# Patient Record
Sex: FEMALE | Race: WHITE | Employment: OTHER | URBAN - NONMETROPOLITAN AREA
[De-identification: names, ages, dates, MRNs, and addresses within clinical notes are randomized per-mention and may not be internally consistent; named-entity substitution may affect disease eponyms.]

---

## 2017-01-04 ENCOUNTER — PATIENT MESSAGE (OUTPATIENT)
Dept: FAMILY MEDICINE CLINIC | Facility: CLINIC | Age: 62
End: 2017-01-04

## 2017-01-04 NOTE — TELEPHONE ENCOUNTER
From: Radha Bolaños  To: Jonny Ricardo., DO  Sent: 1/4/2017 4:10 PM CST  Subject: Other    I went to a Dr. Allison Zamorano after my hospital stay in May. He told me I would not need a colonoscopy until 2020 at least. Please take the reminder off my chart.

## 2017-01-05 ENCOUNTER — MED REC SCAN ONLY (OUTPATIENT)
Dept: FAMILY MEDICINE CLINIC | Facility: CLINIC | Age: 62
End: 2017-01-05

## 2017-01-05 NOTE — TELEPHONE ENCOUNTER
Regarding: Other  Contact: [phone]  ----- Message from Freddy Reyes RN sent at 1/4/2017  4:50 PM CST -----       ----- Message from Philomena Duron to James Cutler DO sent at 1/4/2017  4:10 PM -----   I went to a Dr. Erica Carver after my hospital

## 2017-01-11 ENCOUNTER — APPOINTMENT (OUTPATIENT)
Dept: LAB | Age: 62
End: 2017-01-11
Attending: FAMILY MEDICINE
Payer: COMMERCIAL

## 2017-01-11 DIAGNOSIS — R74.8 ELEVATED LIVER ENZYMES: ICD-10-CM

## 2017-01-11 PROCEDURE — 80053 COMPREHEN METABOLIC PANEL: CPT

## 2017-01-11 PROCEDURE — 36415 COLL VENOUS BLD VENIPUNCTURE: CPT

## 2017-02-07 ENCOUNTER — APPOINTMENT (OUTPATIENT)
Dept: LAB | Age: 62
End: 2017-02-07
Attending: FAMILY MEDICINE
Payer: COMMERCIAL

## 2017-02-07 ENCOUNTER — OFFICE VISIT (OUTPATIENT)
Dept: FAMILY MEDICINE CLINIC | Facility: CLINIC | Age: 62
End: 2017-02-07

## 2017-02-07 VITALS
WEIGHT: 128 LBS | OXYGEN SATURATION: 98 % | HEIGHT: 68.25 IN | TEMPERATURE: 98 F | HEART RATE: 88 BPM | BODY MASS INDEX: 19.4 KG/M2 | DIASTOLIC BLOOD PRESSURE: 88 MMHG | SYSTOLIC BLOOD PRESSURE: 152 MMHG

## 2017-02-07 DIAGNOSIS — E78.00 HYPERCHOLESTEREMIA: ICD-10-CM

## 2017-02-07 DIAGNOSIS — N39.43 POST-VOID DRIBBLING: ICD-10-CM

## 2017-02-07 DIAGNOSIS — E55.9 VITAMIN D DEFICIENCY: ICD-10-CM

## 2017-02-07 DIAGNOSIS — R03.0 ELEVATED BLOOD PRESSURE READING: ICD-10-CM

## 2017-02-07 DIAGNOSIS — R74.8 ELEVATED LIVER ENZYMES: ICD-10-CM

## 2017-02-07 DIAGNOSIS — Z00.00 PREVENTATIVE HEALTH CARE: Primary | ICD-10-CM

## 2017-02-07 DIAGNOSIS — K21.9 GASTROESOPHAGEAL REFLUX DISEASE WITHOUT ESOPHAGITIS: ICD-10-CM

## 2017-02-07 DIAGNOSIS — N81.11 MIDLINE CYSTOCELE: ICD-10-CM

## 2017-02-07 LAB
25-HYDROXYVITAMIN D (TOTAL): 55.3 NG/ML (ref 30–100)
ALT SERPL-CCNC: 117 U/L (ref 14–54)
APPEARANCE: CLEAR
AST SERPL-CCNC: 92 U/L (ref 15–41)
CHOLEST SMN-MCNC: 262 MG/DL (ref ?–200)
HDLC SERPL-MCNC: 87 MG/DL (ref 45–?)
HDLC SERPL: 3.01 {RATIO} (ref ?–4.44)
LDLC SERPL CALC-MCNC: 157 MG/DL (ref ?–130)
MULTISTIX LOT#: NORMAL NUMERIC
NONHDLC SERPL-MCNC: 175 MG/DL (ref ?–130)
PH, URINE: 6.5 (ref 4.5–8)
SPECIFIC GRAVITY: 1.02 (ref 1–1.03)
TRIGLYCERIDES: 89 MG/DL (ref ?–150)
URINE-COLOR: YELLOW
UROBILINOGEN,SEMI-QN: 0.2 MG/DL (ref 0–1.9)
VLDL: 18 MG/DL (ref 5–40)

## 2017-02-07 PROCEDURE — 36415 COLL VENOUS BLD VENIPUNCTURE: CPT | Performed by: FAMILY MEDICINE

## 2017-02-07 PROCEDURE — 80061 LIPID PANEL: CPT

## 2017-02-07 PROCEDURE — 82306 VITAMIN D 25 HYDROXY: CPT | Performed by: FAMILY MEDICINE

## 2017-02-07 PROCEDURE — 84460 ALANINE AMINO (ALT) (SGPT): CPT | Performed by: FAMILY MEDICINE

## 2017-02-07 PROCEDURE — 99396 PREV VISIT EST AGE 40-64: CPT | Performed by: FAMILY MEDICINE

## 2017-02-07 PROCEDURE — 81003 URINALYSIS AUTO W/O SCOPE: CPT | Performed by: FAMILY MEDICINE

## 2017-02-07 PROCEDURE — 84450 TRANSFERASE (AST) (SGOT): CPT | Performed by: FAMILY MEDICINE

## 2017-02-07 RX ORDER — OMEPRAZOLE 40 MG/1
40 CAPSULE, DELAYED RELEASE ORAL DAILY
Qty: 30 CAPSULE | Refills: 3 | Status: SHIPPED | OUTPATIENT
Start: 2017-02-07 | End: 2017-05-17

## 2017-02-07 NOTE — PATIENT INSTRUCTIONS
I reviewed age-appropriate preventative health screening exams. I reviewed age-appropriate immunizations.   Patient is up-to-date, would recommend continued annual flu vaccines  We will check repeat ALT, AST, lipids, and vitamin D level  Would recommend om

## 2017-02-07 NOTE — H&P
HPI:   Ben Nagel is a 57year old female who presents for a complete physical exam.  Patient concerns:   \"a occ list\". Spot on face, What can she take for pain?   Occ right side of neck and shoulder,  Occ Stone, check liver- has been seeing Dr Robert Botello COLONOSCOPY  3/2/11    Comment normal    UPPER GI ENDOSCOPY - REFERRAL  1/3/14    Comment NEG    LASIK  1998    D & C        Family History   Problem Relation Age of Onset   • Asthma Son    • Cancer Father      COLON, PROSTATE   • Heart Disorder Father lately  HEMATOLOGIC: denies unexplained bruising or bleeding  ENDOCRINE: denies heat or cold intolerance , no unexplained wt loss or gain  EXAM:   /88 mmHg  Pulse 88  Temp(Src) 98.3 °F (36.8 °C) (Temporal)  Ht 68.25\"  Wt 128 lb  BMI 19.31 kg/m2  SpO for a complete physical exam.   Preventative health care  (primary encounter diagnosis)  Hypercholesteremia  Elevated liver enzymes  Vitamin d deficiency  Gastroesophageal reflux disease without esophagitis  Elevated blood pressure reading  Post-void dribb follow-up mammogram in April  Repeat Pap smear 2018  I have asked patient to begin monitoring her blood pressure at home and report her readings over the next 2 weeks  Discussed importance of stress reduction and management of anxiety.   With regard to [facility]

## 2017-02-10 ENCOUNTER — TELEPHONE (OUTPATIENT)
Dept: FAMILY MEDICINE CLINIC | Facility: CLINIC | Age: 62
End: 2017-02-10

## 2017-02-10 NOTE — TELEPHONE ENCOUNTER
Pt calls. Wanted to let Dr. Galileo Mccurdy know that Claude Henriquez from Dr. Aj kelsey called her today re: her elevated LFT's. States Dr. Amy Aguilera isn't in the office today, so she had Pallavi NP review results.   She recommended pt stop all supplements except vit D and h

## 2017-02-14 ENCOUNTER — APPOINTMENT (OUTPATIENT)
Dept: LAB | Age: 62
End: 2017-02-14
Attending: FAMILY MEDICINE
Payer: COMMERCIAL

## 2017-02-14 DIAGNOSIS — R74.8 ELEVATED LIVER ENZYMES: ICD-10-CM

## 2017-02-14 PROCEDURE — 36415 COLL VENOUS BLD VENIPUNCTURE: CPT

## 2017-02-14 PROCEDURE — 80053 COMPREHEN METABOLIC PANEL: CPT

## 2017-03-15 ENCOUNTER — APPOINTMENT (OUTPATIENT)
Dept: LAB | Age: 62
End: 2017-03-15
Attending: FAMILY MEDICINE
Payer: COMMERCIAL

## 2017-03-15 DIAGNOSIS — R74.8 ELEVATED LIVER ENZYMES: ICD-10-CM

## 2017-03-15 PROCEDURE — 36415 COLL VENOUS BLD VENIPUNCTURE: CPT

## 2017-03-15 PROCEDURE — 80053 COMPREHEN METABOLIC PANEL: CPT

## 2017-03-17 ENCOUNTER — PATIENT MESSAGE (OUTPATIENT)
Dept: FAMILY MEDICINE CLINIC | Facility: CLINIC | Age: 62
End: 2017-03-17

## 2017-03-17 NOTE — TELEPHONE ENCOUNTER
Regarding: Test Results Question  Contact: [phone]  ----- Message from Roderick Harper RN sent at 3/17/2017  2:07 PM CDT -----       ----- Message from Saritha Patel to Clarence Heredia DO sent at 3/17/2017 11:47 AM -----   Dr Abdiel Graf ordered a Rylee Her[address]is Ultramar 112

## 2017-03-17 NOTE — TELEPHONE ENCOUNTER
Regarding: Test Results Question  Contact: [phone]  ----- Message from Jd Acevedo RN sent at 3/17/2017  2:07 PM CDT -----       ----- Message from Desiree Kaur to Marquis Devi DO sent at 3/17/2017 11:47 AM -----   Dr Eloise Cline ordered a Rylee Her[address]is Ultramar 112

## 2017-03-17 NOTE — TELEPHONE ENCOUNTER
I am not sure how to release the lab to my chart. We can copy the lab and fax it to her or she can stop by and pick it up.   Please let the patient know that her liver enzymes are still elevated but slightly less than 1 month ago

## 2017-03-17 NOTE — TELEPHONE ENCOUNTER
From: Maninder Shaikh  To: Emmanuel Watts.DO  Sent: 3/17/2017 11:47 AM CDT  Subject: Test Results Question    Dr Hilda Henry ordered a CMP before I visit him again and I got it done Wednesday (3/15) at [facility]  Is there a way I can view the results before I

## 2017-03-24 ENCOUNTER — OFFICE VISIT (OUTPATIENT)
Dept: FAMILY MEDICINE CLINIC | Facility: CLINIC | Age: 62
End: 2017-03-24

## 2017-03-24 VITALS
OXYGEN SATURATION: 99 % | BODY MASS INDEX: 19 KG/M2 | TEMPERATURE: 99 F | WEIGHT: 125 LBS | SYSTOLIC BLOOD PRESSURE: 130 MMHG | HEART RATE: 78 BPM | DIASTOLIC BLOOD PRESSURE: 82 MMHG

## 2017-03-24 DIAGNOSIS — J01.40 ACUTE NON-RECURRENT PANSINUSITIS: Primary | ICD-10-CM

## 2017-03-24 PROCEDURE — 99213 OFFICE O/P EST LOW 20 MIN: CPT | Performed by: FAMILY MEDICINE

## 2017-03-24 RX ORDER — FLUTICASONE PROPIONATE 50 MCG
2 SPRAY, SUSPENSION (ML) NASAL DAILY
Qty: 1 BOTTLE | Refills: 0 | COMMUNITY
Start: 2017-03-24 | End: 2017-07-25

## 2017-03-24 RX ORDER — DOXYCYCLINE 100 MG/1
100 TABLET ORAL 2 TIMES DAILY
Qty: 28 TABLET | Refills: 0 | Status: SHIPPED | OUTPATIENT
Start: 2017-03-24 | End: 2017-04-03

## 2017-03-24 NOTE — PROGRESS NOTES
HPI:   Joyce Love is a 60year old female who presents for upper respiratory symptoms for  10  days.  Patient reports congestion, green colored nasal discharge, cough with green colored sputum, cough is not keeping pt up at night, sinus pain, loss of sense pain  NEURO: denies headaches    EXAM:   /82 mmHg  Pulse 78  Temp(Src) 98.8 °F (37.1 °C) (Temporal)  Wt 125 lb  SpO2 99%  GENERAL: well developed, well nourished,in no apparent distress  SKIN: warm and dry, no rashes,   EYES:  conjunctiva are clear,

## 2017-03-24 NOTE — PATIENT INSTRUCTIONS
Push fluids, nasal saline ad garcía., may use Flonase 2 sprays per nostril daily  Doxycycline 100 mg twice daily ×10 days

## 2017-04-05 ENCOUNTER — TELEPHONE (OUTPATIENT)
Dept: FAMILY MEDICINE CLINIC | Facility: CLINIC | Age: 62
End: 2017-04-05

## 2017-04-06 NOTE — TELEPHONE ENCOUNTER
Patient notified and verbalized understanding of the information provided  No additional questions at this time

## 2017-04-13 ENCOUNTER — APPOINTMENT (OUTPATIENT)
Dept: LAB | Age: 62
End: 2017-04-13
Attending: FAMILY MEDICINE
Payer: COMMERCIAL

## 2017-04-13 DIAGNOSIS — R74.8 ELEVATED LIVER ENZYMES: ICD-10-CM

## 2017-04-13 PROCEDURE — 83516 IMMUNOASSAY NONANTIBODY: CPT

## 2017-04-13 PROCEDURE — 36415 COLL VENOUS BLD VENIPUNCTURE: CPT

## 2017-04-13 PROCEDURE — 86376 MICROSOMAL ANTIBODY EACH: CPT

## 2017-04-13 PROCEDURE — 84432 ASSAY OF THYROGLOBULIN: CPT

## 2017-04-13 PROCEDURE — 82784 ASSAY IGA/IGD/IGG/IGM EACH: CPT

## 2017-04-13 PROCEDURE — 80053 COMPREHEN METABOLIC PANEL: CPT

## 2017-04-13 PROCEDURE — 86038 ANTINUCLEAR ANTIBODIES: CPT

## 2017-04-26 ENCOUNTER — APPOINTMENT (OUTPATIENT)
Dept: LAB | Age: 62
End: 2017-04-26
Attending: FAMILY MEDICINE
Payer: COMMERCIAL

## 2017-04-26 DIAGNOSIS — R74.8 ELEVATED LIVER ENZYMES: ICD-10-CM

## 2017-04-26 PROCEDURE — 80053 COMPREHEN METABOLIC PANEL: CPT

## 2017-04-26 PROCEDURE — 36415 COLL VENOUS BLD VENIPUNCTURE: CPT

## 2017-05-03 ENCOUNTER — APPOINTMENT (OUTPATIENT)
Dept: LAB | Age: 62
End: 2017-05-03
Attending: FAMILY MEDICINE
Payer: COMMERCIAL

## 2017-05-03 DIAGNOSIS — R74.8 ELEVATED LIVER ENZYMES: ICD-10-CM

## 2017-05-03 PROCEDURE — 36415 COLL VENOUS BLD VENIPUNCTURE: CPT

## 2017-05-03 PROCEDURE — 80053 COMPREHEN METABOLIC PANEL: CPT

## 2017-05-10 ENCOUNTER — APPOINTMENT (OUTPATIENT)
Dept: LAB | Age: 62
End: 2017-05-10
Attending: FAMILY MEDICINE
Payer: COMMERCIAL

## 2017-05-10 DIAGNOSIS — R74.8 ELEVATED LIVER ENZYMES: ICD-10-CM

## 2017-05-10 PROCEDURE — 80053 COMPREHEN METABOLIC PANEL: CPT

## 2017-05-10 PROCEDURE — 36415 COLL VENOUS BLD VENIPUNCTURE: CPT

## 2017-05-17 ENCOUNTER — APPOINTMENT (OUTPATIENT)
Dept: LAB | Age: 62
End: 2017-05-17
Attending: FAMILY MEDICINE
Payer: COMMERCIAL

## 2017-05-17 ENCOUNTER — OFFICE VISIT (OUTPATIENT)
Dept: FAMILY MEDICINE CLINIC | Facility: CLINIC | Age: 62
End: 2017-05-17

## 2017-05-17 VITALS
DIASTOLIC BLOOD PRESSURE: 68 MMHG | HEART RATE: 74 BPM | BODY MASS INDEX: 19 KG/M2 | WEIGHT: 126 LBS | SYSTOLIC BLOOD PRESSURE: 118 MMHG | OXYGEN SATURATION: 99 % | TEMPERATURE: 98 F

## 2017-05-17 DIAGNOSIS — L21.9 SEBORRHEIC DERMATITIS OF SCALP: Primary | ICD-10-CM

## 2017-05-17 DIAGNOSIS — R74.8 ELEVATED LIVER ENZYMES: ICD-10-CM

## 2017-05-17 DIAGNOSIS — S86.812A STRAIN OF TIBIALIS ANTERIOR MUSCLE, LEFT, INITIAL ENCOUNTER: ICD-10-CM

## 2017-05-17 DIAGNOSIS — M77.51 CALCANEAL BURSITIS, RIGHT: ICD-10-CM

## 2017-05-17 PROCEDURE — 99214 OFFICE O/P EST MOD 30 MIN: CPT | Performed by: FAMILY MEDICINE

## 2017-05-17 PROCEDURE — 80053 COMPREHEN METABOLIC PANEL: CPT

## 2017-05-17 PROCEDURE — 36415 COLL VENOUS BLD VENIPUNCTURE: CPT

## 2017-05-17 RX ORDER — MOMETASONE FUROATE 1 MG/ML
1 SOLUTION TOPICAL NIGHTLY PRN
Qty: 30 ML | Refills: 0 | Status: SHIPPED | OUTPATIENT
Start: 2017-05-17 | End: 2018-06-15 | Stop reason: ALTCHOICE

## 2017-05-17 RX ORDER — OMEPRAZOLE 40 MG/1
40 CAPSULE, DELAYED RELEASE ORAL EVERY OTHER DAY
COMMUNITY
End: 2017-10-17

## 2017-05-17 NOTE — PROGRESS NOTES
Humphrey Morejon is a 60year old female. Patient presents with:  Lesion: BACK OF NECK-- ITCHY--    HPI:   Patient states she has a recurrent spot on the back of her scalp that is dry and flaky and recently has become itchy.   Patient also reports a strain in the Approximately 4 x 2 cm oval patch of dry flaky skin at the back of the head in the hair line.   No alopecia or broken hair shafts noted remainder of scalp clear  ENT: TMs: intact, good mobility, Nose: turbinates clear, no dc, Throat: no erythema, pnd, or le

## 2017-05-17 NOTE — PATIENT INSTRUCTIONS
Patient was again instructed in diaphragmatic activation and zone 1 self activation techniques.   Reviewed proper spinal, hip, hamstring, calf, plantar tendon stretches  Elocon lotion thinly nightly as needed to affected area on the scalp  HALLE pina

## 2017-05-24 ENCOUNTER — APPOINTMENT (OUTPATIENT)
Dept: LAB | Age: 62
End: 2017-05-24
Attending: FAMILY MEDICINE
Payer: COMMERCIAL

## 2017-05-24 DIAGNOSIS — R74.8 ELEVATED LIVER ENZYMES: ICD-10-CM

## 2017-05-24 PROCEDURE — 36415 COLL VENOUS BLD VENIPUNCTURE: CPT

## 2017-05-24 PROCEDURE — 80053 COMPREHEN METABOLIC PANEL: CPT

## 2017-05-25 ENCOUNTER — PATIENT MESSAGE (OUTPATIENT)
Dept: FAMILY MEDICINE CLINIC | Facility: CLINIC | Age: 62
End: 2017-05-25

## 2017-05-31 ENCOUNTER — APPOINTMENT (OUTPATIENT)
Dept: LAB | Age: 62
End: 2017-05-31
Attending: FAMILY MEDICINE
Payer: COMMERCIAL

## 2017-05-31 DIAGNOSIS — R74.8 ELEVATED LIVER ENZYMES: ICD-10-CM

## 2017-05-31 PROCEDURE — 80053 COMPREHEN METABOLIC PANEL: CPT

## 2017-05-31 PROCEDURE — 36415 COLL VENOUS BLD VENIPUNCTURE: CPT

## 2017-06-07 ENCOUNTER — APPOINTMENT (OUTPATIENT)
Dept: LAB | Age: 62
End: 2017-06-07
Attending: FAMILY MEDICINE
Payer: COMMERCIAL

## 2017-06-07 DIAGNOSIS — R74.8 ELEVATED LIVER ENZYMES: ICD-10-CM

## 2017-06-07 PROCEDURE — 36415 COLL VENOUS BLD VENIPUNCTURE: CPT

## 2017-06-07 PROCEDURE — 80053 COMPREHEN METABOLIC PANEL: CPT

## 2017-06-21 ENCOUNTER — APPOINTMENT (OUTPATIENT)
Dept: LAB | Age: 62
End: 2017-06-21
Attending: FAMILY MEDICINE
Payer: COMMERCIAL

## 2017-06-21 DIAGNOSIS — R74.8 ELEVATED LIVER ENZYMES: ICD-10-CM

## 2017-06-21 PROCEDURE — 36415 COLL VENOUS BLD VENIPUNCTURE: CPT

## 2017-06-21 PROCEDURE — 80053 COMPREHEN METABOLIC PANEL: CPT

## 2017-06-23 ENCOUNTER — TELEPHONE (OUTPATIENT)
Dept: FAMILY MEDICINE CLINIC | Facility: CLINIC | Age: 62
End: 2017-06-23

## 2017-06-23 DIAGNOSIS — E87.5 SERUM POTASSIUM ELEVATED: Primary | ICD-10-CM

## 2017-06-23 NOTE — TELEPHONE ENCOUNTER
Patient called stating that Dr. Ebenezer Sanches office is concerned about potassium level. Potassium 5.2 on 6/21. She was instructed to follow up with PCP regarding elevation. Ok to wait for Wednesday upon Dr. Kaylen Long return?

## 2017-06-23 NOTE — TELEPHONE ENCOUNTER
Patient notified. Patient verbalized understanding. Future lab ordered.       Yamilet Rodriguez, 06/23/2017, 4:30 PM

## 2017-06-23 NOTE — TELEPHONE ENCOUNTER
Advise pt of elevated potassium level, may be due to blood draw or inadequate fluid inake  Repeat potassium in 2 weeks

## 2017-07-19 ENCOUNTER — APPOINTMENT (OUTPATIENT)
Dept: LAB | Age: 62
End: 2017-07-19
Attending: FAMILY MEDICINE
Payer: COMMERCIAL

## 2017-07-19 DIAGNOSIS — R74.8 ELEVATED LIVER ENZYMES: ICD-10-CM

## 2017-07-19 DIAGNOSIS — E87.5 SERUM POTASSIUM ELEVATED: ICD-10-CM

## 2017-07-19 LAB
ALBUMIN SERPL-MCNC: 3.8 G/DL (ref 3.5–4.8)
ALP LIVER SERPL-CCNC: 95 U/L (ref 50–130)
ALT SERPL-CCNC: 203 U/L (ref 14–54)
AST SERPL-CCNC: 188 U/L (ref 15–41)
BILIRUB SERPL-MCNC: 0.8 MG/DL (ref 0.1–2)
BUN BLD-MCNC: 13 MG/DL (ref 8–20)
CALCIUM BLD-MCNC: 9.6 MG/DL (ref 8.3–10.3)
CHLORIDE: 107 MMOL/L (ref 101–111)
CO2: 29 MMOL/L (ref 22–32)
CREAT BLD-MCNC: 0.9 MG/DL (ref 0.55–1.02)
GLUCOSE BLD-MCNC: 82 MG/DL (ref 70–99)
M PROTEIN MFR SERPL ELPH: 7.2 G/DL (ref 6.1–8.3)
POTASSIUM SERPL-SCNC: 5.4 MMOL/L (ref 3.6–5.1)
POTASSIUM SERPL-SCNC: 5.4 MMOL/L (ref 3.6–5.1)
SODIUM SERPL-SCNC: 141 MMOL/L (ref 136–144)

## 2017-07-19 PROCEDURE — 36415 COLL VENOUS BLD VENIPUNCTURE: CPT | Performed by: FAMILY MEDICINE

## 2017-07-19 PROCEDURE — 84132 ASSAY OF SERUM POTASSIUM: CPT | Performed by: FAMILY MEDICINE

## 2017-07-25 ENCOUNTER — OFFICE VISIT (OUTPATIENT)
Dept: FAMILY MEDICINE CLINIC | Facility: CLINIC | Age: 62
End: 2017-07-25

## 2017-07-25 VITALS
TEMPERATURE: 98 F | DIASTOLIC BLOOD PRESSURE: 84 MMHG | HEIGHT: 66.5 IN | OXYGEN SATURATION: 99 % | SYSTOLIC BLOOD PRESSURE: 138 MMHG | HEART RATE: 80 BPM | WEIGHT: 127 LBS | BODY MASS INDEX: 20.17 KG/M2

## 2017-07-25 DIAGNOSIS — Z01.818 PRE-OP EVALUATION: ICD-10-CM

## 2017-07-25 DIAGNOSIS — E55.9 VITAMIN D DEFICIENCY: ICD-10-CM

## 2017-07-25 DIAGNOSIS — R74.8 ELEVATED LIVER ENZYMES: Primary | ICD-10-CM

## 2017-07-25 PROCEDURE — 99243 OFF/OP CNSLTJ NEW/EST LOW 30: CPT | Performed by: FAMILY MEDICINE

## 2017-07-25 RX ORDER — MELATONIN 5 MG
5 TABLET,CHEWABLE ORAL NIGHTLY
COMMUNITY
Start: 2017-02-20

## 2017-07-25 NOTE — H&P
HPI:   Teresa Flores is a 60year old female presents for preoperative consultation. Patient is scheduled for an ultrasound-guided liver biopsy on 7/28/17 in the radiology department at [facility] as ordered by Dr. Lino Henao.   Indication is persiste date: D & C  1998: LASIK  1/3/14: UPPER GI ENDOSCOPY - REFERRAL      Comment: NEG   Family History   Problem Relation Age of Onset   • Asthma Son    • Cancer Father      COLON, PROSTATE   • Heart Disorder Father    • Heart Disorder Mother    • Diabetes Mot bleeding  ENDOCRINE: denies heat or cold intolerance , no unexplained wt loss or gain  EXAM:   /84   Pulse 80   Temp 98.4 °F (36.9 °C) (Temporal)   Ht 66.5\"   Wt 127 lb   SpO2 99%   BMI 20.19 kg/m²   Body mass index is 20.19 kg/m².    GENERAL: well d

## 2017-07-28 ENCOUNTER — HOSPITAL ENCOUNTER (OUTPATIENT)
Dept: ULTRASOUND IMAGING | Facility: HOSPITAL | Age: 62
Discharge: HOME | End: 2017-07-28
Attending: INTERNAL MEDICINE
Payer: COMMERCIAL

## 2017-07-28 ENCOUNTER — APPOINTMENT (OUTPATIENT)
Dept: LAB | Facility: HOSPITAL | Age: 62
End: 2017-07-28
Attending: INTERNAL MEDICINE
Payer: COMMERCIAL

## 2017-07-28 VITALS
TEMPERATURE: 98 F | HEIGHT: 68 IN | BODY MASS INDEX: 18.94 KG/M2 | SYSTOLIC BLOOD PRESSURE: 144 MMHG | HEART RATE: 67 BPM | OXYGEN SATURATION: 100 % | WEIGHT: 125 LBS | DIASTOLIC BLOOD PRESSURE: 86 MMHG | RESPIRATION RATE: 16 BRPM

## 2017-07-28 DIAGNOSIS — R74.8 ELEVATED LIVER ENZYMES: Primary | ICD-10-CM

## 2017-07-28 DIAGNOSIS — R74.8 ELEVATED LIVER ENZYMES: ICD-10-CM

## 2017-07-28 LAB
BASOPHILS # BLD AUTO: 0.1 X10(3) UL (ref 0–0.1)
BASOPHILS NFR BLD AUTO: 1.5 %
EOSINOPHIL # BLD AUTO: 0.33 X10(3) UL (ref 0–0.3)
EOSINOPHIL NFR BLD AUTO: 5 %
ERYTHROCYTE [DISTWIDTH] IN BLOOD BY AUTOMATED COUNT: 14.3 % (ref 11.5–16)
HCT VFR BLD AUTO: 41.4 % (ref 34–50)
HGB BLD-MCNC: 13.7 G/DL (ref 12–16)
IMMATURE GRANULOCYTE COUNT: 0.01 X10(3) UL (ref 0–1)
IMMATURE GRANULOCYTE RATIO %: 0.2 %
INR BLD: 1.08 (ref 0.89–1.11)
LYMPHOCYTES # BLD AUTO: 2.45 X10(3) UL (ref 0.9–4)
LYMPHOCYTES NFR BLD AUTO: 37.4 %
MCH RBC QN AUTO: 29 PG (ref 27–33.2)
MCHC RBC AUTO-ENTMCNC: 33.1 G/DL (ref 31–37)
MCV RBC AUTO: 87.5 FL (ref 81–100)
MONOCYTES # BLD AUTO: 0.64 X10(3) UL (ref 0.1–0.6)
MONOCYTES NFR BLD AUTO: 9.8 %
NEUTROPHIL ABS PRELIM: 3.02 X10 (3) UL (ref 1.3–6.7)
NEUTROPHILS # BLD AUTO: 3.02 X10(3) UL (ref 1.3–6.7)
NEUTROPHILS NFR BLD AUTO: 46.1 %
PLATELET # BLD AUTO: 182 10(3)UL (ref 150–450)
PSA SERPL DL<=0.01 NG/ML-MCNC: 14 SECONDS (ref 12–14.3)
RBC # BLD AUTO: 4.73 X10(6)UL (ref 3.8–5.1)
RED CELL DISTRIBUTION WIDTH-SD: 45.6 FL (ref 35.1–46.3)
WBC # BLD AUTO: 6.6 X10(3) UL (ref 4–13)

## 2017-07-28 PROCEDURE — 88307 TISSUE EXAM BY PATHOLOGIST: CPT | Performed by: INTERNAL MEDICINE

## 2017-07-28 PROCEDURE — 85610 PROTHROMBIN TIME: CPT

## 2017-07-28 PROCEDURE — 88313 SPECIAL STAINS GROUP 2: CPT | Performed by: INTERNAL MEDICINE

## 2017-07-28 PROCEDURE — 88321 CONSLTJ&REPRT SLD PREP ELSWR: CPT | Performed by: INTERNAL MEDICINE

## 2017-07-28 PROCEDURE — 36415 COLL VENOUS BLD VENIPUNCTURE: CPT

## 2017-07-28 PROCEDURE — 76942 ECHO GUIDE FOR BIOPSY: CPT | Performed by: INTERNAL MEDICINE

## 2017-07-28 PROCEDURE — 85025 COMPLETE CBC W/AUTO DIFF WBC: CPT

## 2017-07-28 PROCEDURE — 47000 NEEDLE BIOPSY OF LIVER PERQ: CPT | Performed by: INTERNAL MEDICINE

## 2017-07-28 PROCEDURE — 99152 MOD SED SAME PHYS/QHP 5/>YRS: CPT | Performed by: INTERNAL MEDICINE

## 2017-07-28 RX ORDER — IBUPROFEN 200 MG
400 TABLET ORAL ONCE
Status: COMPLETED | OUTPATIENT
Start: 2017-07-28 | End: 2017-07-28

## 2017-07-28 RX ORDER — MIDAZOLAM HYDROCHLORIDE 1 MG/ML
1 INJECTION INTRAMUSCULAR; INTRAVENOUS EVERY 5 MIN PRN
Status: ACTIVE | OUTPATIENT
Start: 2017-07-28 | End: 2017-07-28

## 2017-07-28 RX ORDER — SODIUM CHLORIDE 9 MG/ML
INJECTION, SOLUTION INTRAVENOUS CONTINUOUS
Status: DISCONTINUED | OUTPATIENT
Start: 2017-07-28 | End: 2017-08-01

## 2017-07-28 RX ORDER — NALOXONE HYDROCHLORIDE 0.4 MG/ML
80 INJECTION, SOLUTION INTRAMUSCULAR; INTRAVENOUS; SUBCUTANEOUS AS NEEDED
Status: DISCONTINUED | OUTPATIENT
Start: 2017-07-28 | End: 2017-08-01

## 2017-07-28 RX ORDER — NALOXONE HYDROCHLORIDE 0.4 MG/ML
INJECTION, SOLUTION INTRAMUSCULAR; INTRAVENOUS; SUBCUTANEOUS
Status: DISCONTINUED
Start: 2017-07-28 | End: 2017-07-28 | Stop reason: WASHOUT

## 2017-07-28 RX ORDER — FLUMAZENIL 0.1 MG/ML
0.2 INJECTION, SOLUTION INTRAVENOUS AS NEEDED
Status: DISCONTINUED | OUTPATIENT
Start: 2017-07-28 | End: 2017-08-01

## 2017-07-28 RX ORDER — FLUMAZENIL 0.1 MG/ML
INJECTION, SOLUTION INTRAVENOUS
Status: DISCONTINUED
Start: 2017-07-28 | End: 2017-07-28 | Stop reason: WASHOUT

## 2017-07-28 RX ORDER — MIDAZOLAM HYDROCHLORIDE 1 MG/ML
INJECTION INTRAMUSCULAR; INTRAVENOUS
Status: COMPLETED
Start: 2017-07-28 | End: 2017-07-28

## 2017-07-28 RX ADMIN — IBUPROFEN 400 MG: 200 MG TABLET ORAL at 11:08:00

## 2017-07-28 RX ADMIN — SODIUM CHLORIDE: 9 INJECTION, SOLUTION INTRAVENOUS at 08:50:00

## 2017-07-28 RX ADMIN — MIDAZOLAM HYDROCHLORIDE 1 MG: 1 INJECTION INTRAMUSCULAR; INTRAVENOUS at 08:53:00

## 2017-07-28 NOTE — IMAGING NOTE
Dr. Leena Zavala explained procedure to patient. Patient signed consent. Time out performed. Patient tolerated procedure well. Dressing to abdomen, clean and dry. Assisted position of comfort. Kept patient warmth and rested. Report to [facility] RN.  Patient t

## 2017-07-28 NOTE — H&P
[facility] Patient Status:  Outpatient    3/20/1955 MRN [id number]   Location [address] [place] [address] Attending Kristal Abbasi MD   Hosp Day # 0 PCP Martha Hart.  Lazaro Chowdhury DO     Admitting Diagnosis:   60 year-o Application topically nightly as needed. , Disp: 30 mL, Rfl: 0  •  Clobetasol Propionate (TEMOVATE) 0.05 % External Cream, Apply 1 Application topically nightly as needed (FOR ANAL ITCHING). , Disp: 15 g, Rfl: 1  •  cetirizine (ZYRTEC) 10 MG Oral Tab, Take 1

## 2017-07-28 NOTE — OR NURSING
Patients pain improved after Ibuprofen. Dr. Jennifer Sy called and updated on patients condition. VSS, site with scant drainage, unchanged over the last 2 hours. Denies any sob. Ok to discharge to home per Dr. Jennifer Sy.  Discharge instructions discussed with

## 2017-07-28 NOTE — PROCEDURES
[facility]  Procedure Note    Edwin Montejo Patient Status:  Outpatient    3/20/1955 MRN [id number]   Location [address] [place] [address] Attending Delores Ward MD   Hosp Day # 0 PCP Johnnie Eastman.  Julian Worthy DO     Procedure: Liver biopsy    Pre-Proced

## 2017-08-09 ENCOUNTER — APPOINTMENT (OUTPATIENT)
Dept: LAB | Age: 62
End: 2017-08-09
Attending: FAMILY MEDICINE
Payer: COMMERCIAL

## 2017-08-09 DIAGNOSIS — R74.8 ELEVATED LIVER ENZYMES: ICD-10-CM

## 2017-08-09 LAB
ALBUMIN SERPL-MCNC: 3.8 G/DL (ref 3.5–4.8)
ALP LIVER SERPL-CCNC: 98 U/L (ref 50–130)
ALT SERPL-CCNC: 119 U/L (ref 14–54)
AST SERPL-CCNC: 91 U/L (ref 15–41)
BILIRUB SERPL-MCNC: 0.8 MG/DL (ref 0.1–2)
BUN BLD-MCNC: 14 MG/DL (ref 8–20)
CALCIUM BLD-MCNC: 9.7 MG/DL (ref 8.3–10.3)
CHLORIDE: 105 MMOL/L (ref 101–111)
CO2: 28 MMOL/L (ref 22–32)
CREAT BLD-MCNC: 1.07 MG/DL (ref 0.55–1.02)
GLUCOSE BLD-MCNC: 89 MG/DL (ref 70–99)
INR BLD: 1.07 (ref 0.89–1.11)
M PROTEIN MFR SERPL ELPH: 7.5 G/DL (ref 6.1–8.3)
POTASSIUM SERPL-SCNC: 4.6 MMOL/L (ref 3.6–5.1)
PSA SERPL DL<=0.01 NG/ML-MCNC: 13.9 SECONDS (ref 12–14.3)
SODIUM SERPL-SCNC: 139 MMOL/L (ref 136–144)

## 2017-08-09 PROCEDURE — 36415 COLL VENOUS BLD VENIPUNCTURE: CPT

## 2017-08-09 PROCEDURE — 80053 COMPREHEN METABOLIC PANEL: CPT

## 2017-08-09 PROCEDURE — 85610 PROTHROMBIN TIME: CPT

## 2017-08-21 ENCOUNTER — MED REC SCAN ONLY (OUTPATIENT)
Dept: FAMILY MEDICINE CLINIC | Facility: CLINIC | Age: 62
End: 2017-08-21

## 2017-09-20 ENCOUNTER — OFFICE VISIT (OUTPATIENT)
Dept: SURGERY | Facility: CLINIC | Age: 62
End: 2017-09-20

## 2017-09-20 ENCOUNTER — NURSE ONLY (OUTPATIENT)
Dept: HEMATOLOGY/ONCOLOGY | Facility: HOSPITAL | Age: 62
End: 2017-09-20
Attending: INTERNAL MEDICINE
Payer: COMMERCIAL

## 2017-09-20 VITALS
SYSTOLIC BLOOD PRESSURE: 155 MMHG | DIASTOLIC BLOOD PRESSURE: 92 MMHG | HEART RATE: 87 BPM | WEIGHT: 133 LBS | TEMPERATURE: 98 F | BODY MASS INDEX: 20.16 KG/M2 | HEIGHT: 68 IN

## 2017-09-20 DIAGNOSIS — R79.89 ABNORMAL LIVER FUNCTION TESTS: Primary | ICD-10-CM

## 2017-09-20 DIAGNOSIS — R79.89 ABNORMAL LIVER FUNCTION TESTS: ICD-10-CM

## 2017-09-20 LAB
ALBUMIN SERPL-MCNC: 4 G/DL (ref 3.5–4.8)
ALP LIVER SERPL-CCNC: 86 U/L (ref 50–130)
ALT SERPL-CCNC: 40 U/L (ref 14–54)
AST SERPL-CCNC: 38 U/L (ref 15–41)
BILIRUB DIRECT SERPL-MCNC: 0.1 MG/DL (ref 0.1–0.5)
BILIRUB SERPL-MCNC: 0.5 MG/DL (ref 0.1–2)
CK: 88 IU/L (ref 26–192)
IMMUNOGLOBULIN A: 263 MG/DL (ref 70–312)
IMMUNOGLOBULIN G: 1020 MG/DL (ref 791–1643)
IMMUNOGLOBULIN M: 147 MG/DL (ref 43–279)
M PROTEIN MFR SERPL ELPH: 7.8 G/DL (ref 6.1–8.3)

## 2017-09-20 PROCEDURE — 80076 HEPATIC FUNCTION PANEL: CPT

## 2017-09-20 PROCEDURE — 82784 ASSAY IGA/IGD/IGG/IGM EACH: CPT

## 2017-09-20 PROCEDURE — 36415 COLL VENOUS BLD VENIPUNCTURE: CPT

## 2017-09-20 PROCEDURE — 82550 ASSAY OF CK (CPK): CPT

## 2017-09-20 NOTE — PROGRESS NOTES
[facility]  [address], [address]  [address]  [phone] ([phone]) Smokeless tobacco: Never Used                      Alcohol use:  No                   Medications:  •  Melatonin 5 MG Oral Chew Tab, Chew 5 mg by mouth nightly., Disp: , Rfl:   •  Mometasone Furoate (ELOCON) 0.1 % External Solution, Apply 1 Ronn can be very slow. - Follow up pending the results of repeat labs    Duc Welch MD  Director of Hepatology  Medical Director of [facility] of [address]  [address]  [facility], 7th floor, [address]

## 2017-09-22 ENCOUNTER — TELEPHONE (OUTPATIENT)
Dept: SURGERY | Facility: CLINIC | Age: 62
End: 2017-09-22

## 2017-09-22 ENCOUNTER — HOSPITAL ENCOUNTER (OUTPATIENT)
Dept: BONE DENSITY | Age: 62
Discharge: HOME | End: 2017-09-22
Attending: FAMILY MEDICINE
Payer: COMMERCIAL

## 2017-09-22 DIAGNOSIS — Z78.0 POSTMENOPAUSAL: ICD-10-CM

## 2017-09-22 PROCEDURE — 77080 DXA BONE DENSITY AXIAL: CPT | Performed by: FAMILY MEDICINE

## 2017-09-22 NOTE — TELEPHONE ENCOUNTER
LFT all normalized now so hold off on any prednisone and hope this is suggestive injury is resolved.  Would recommend just repeating liver tests next time she sees Dr. Monica Bedolla and I will remain available PRN

## 2017-10-17 ENCOUNTER — OFFICE VISIT (OUTPATIENT)
Dept: FAMILY MEDICINE CLINIC | Facility: CLINIC | Age: 62
End: 2017-10-17

## 2017-10-17 VITALS
TEMPERATURE: 98 F | BODY MASS INDEX: 19.4 KG/M2 | HEIGHT: 68.75 IN | SYSTOLIC BLOOD PRESSURE: 138 MMHG | OXYGEN SATURATION: 99 % | DIASTOLIC BLOOD PRESSURE: 80 MMHG | WEIGHT: 131 LBS | HEART RATE: 72 BPM

## 2017-10-17 DIAGNOSIS — R74.8 ELEVATED LIVER ENZYMES: ICD-10-CM

## 2017-10-17 DIAGNOSIS — E78.00 HYPERCHOLESTEREMIA: Primary | ICD-10-CM

## 2017-10-17 DIAGNOSIS — M85.852 OSTEOPENIA OF LEFT THIGH: ICD-10-CM

## 2017-10-17 PROCEDURE — 99213 OFFICE O/P EST LOW 20 MIN: CPT | Performed by: FAMILY MEDICINE

## 2017-10-17 RX ORDER — ALBUTEROL SULFATE 90 UG/1
2 AEROSOL, METERED RESPIRATORY (INHALATION) EVERY 6 HOURS PRN
COMMUNITY
End: 2019-02-20 | Stop reason: ALTCHOICE

## 2017-10-17 RX ORDER — AMOXICILLIN 500 MG
1200 CAPSULE ORAL DAILY
COMMUNITY
End: 2018-06-15 | Stop reason: ALTCHOICE

## 2017-10-17 NOTE — PROGRESS NOTES
Krunal Galvez is a 60year old female. Patient presents with: Follow - Up: F/U FROM DR. BOWSER--  Lipids: PHYTEL CALL    HPI:   Patient developed elevated liver enzymes last year that persisted up until August when they spontaneously resolved.   Liver biopsy exertion, edema  GI: denies abdominal pain and denies heartburn  NEURO: denies headaches  PSYCH: denies feeling stressed or anxious    EXAM:   /80   Pulse 72   Temp 98 °F (36.7 °C) (Temporal)   Ht 68.75\"   Wt 131 lb   SpO2 99%   BMI 19.49 kg/m²   GE

## 2017-10-17 NOTE — PATIENT INSTRUCTIONS
I reviewed the previous workup for elevated liver enzymes. They were presumably due to a drug reaction or concurrent viral infection. Lipids done in February showed a favorable total to HDL ratio.   I advised patient that any recommendation for future med

## 2018-02-14 ENCOUNTER — OFFICE VISIT (OUTPATIENT)
Dept: FAMILY MEDICINE CLINIC | Facility: CLINIC | Age: 63
End: 2018-02-14

## 2018-02-14 ENCOUNTER — APPOINTMENT (OUTPATIENT)
Dept: LAB | Age: 63
End: 2018-02-14
Attending: FAMILY MEDICINE
Payer: COMMERCIAL

## 2018-02-14 VITALS
DIASTOLIC BLOOD PRESSURE: 72 MMHG | TEMPERATURE: 98 F | HEIGHT: 68 IN | BODY MASS INDEX: 20.16 KG/M2 | OXYGEN SATURATION: 99 % | WEIGHT: 133 LBS | SYSTOLIC BLOOD PRESSURE: 120 MMHG | HEART RATE: 74 BPM

## 2018-02-14 DIAGNOSIS — E78.00 HYPERCHOLESTEREMIA: ICD-10-CM

## 2018-02-14 DIAGNOSIS — N81.11 MIDLINE CYSTOCELE: ICD-10-CM

## 2018-02-14 DIAGNOSIS — Z00.00 PREVENTATIVE HEALTH CARE: Primary | ICD-10-CM

## 2018-02-14 DIAGNOSIS — M85.852 OSTEOPENIA OF LEFT THIGH: ICD-10-CM

## 2018-02-14 DIAGNOSIS — R07.89 PRESSURE IN LEFT SIDE OF CHEST: ICD-10-CM

## 2018-02-14 DIAGNOSIS — E55.9 VITAMIN D DEFICIENCY: ICD-10-CM

## 2018-02-14 DIAGNOSIS — Z00.00 LABORATORY EXAM ORDERED AS PART OF ROUTINE GENERAL MEDICAL EXAMINATION: ICD-10-CM

## 2018-02-14 DIAGNOSIS — Z12.31 ENCOUNTER FOR SCREENING MAMMOGRAM FOR BREAST CANCER: ICD-10-CM

## 2018-02-14 DIAGNOSIS — Z12.4 SCREENING FOR CERVICAL CANCER: ICD-10-CM

## 2018-02-14 LAB
25-HYDROXYVITAMIN D (TOTAL): 63.5 NG/ML (ref 30–100)
ALBUMIN SERPL-MCNC: 4 G/DL (ref 3.5–4.8)
ALP LIVER SERPL-CCNC: 70 U/L (ref 50–130)
ALT SERPL-CCNC: 83 U/L (ref 14–54)
AST SERPL-CCNC: 74 U/L (ref 15–41)
BILIRUB SERPL-MCNC: 0.8 MG/DL (ref 0.1–2)
BUN BLD-MCNC: 13 MG/DL (ref 8–20)
CALCIUM BLD-MCNC: 9.7 MG/DL (ref 8.3–10.3)
CHLORIDE: 105 MMOL/L (ref 101–111)
CHOLEST SMN-MCNC: 289 MG/DL (ref ?–200)
CO2: 29 MMOL/L (ref 22–32)
CREAT BLD-MCNC: 0.96 MG/DL (ref 0.55–1.02)
GLUCOSE BLD-MCNC: 100 MG/DL (ref 70–99)
HDLC SERPL-MCNC: 81 MG/DL (ref 45–?)
HDLC SERPL: 3.57 {RATIO} (ref ?–4.44)
LDLC SERPL CALC-MCNC: 187 MG/DL (ref ?–130)
M PROTEIN MFR SERPL ELPH: 7.7 G/DL (ref 6.1–8.3)
NONHDLC SERPL-MCNC: 208 MG/DL (ref ?–130)
POTASSIUM SERPL-SCNC: 5.2 MMOL/L (ref 3.6–5.1)
SODIUM SERPL-SCNC: 139 MMOL/L (ref 136–144)
TRIGL SERPL-MCNC: 105 MG/DL (ref ?–150)
VLDLC SERPL CALC-MCNC: 21 MG/DL (ref 5–40)

## 2018-02-14 PROCEDURE — 87624 HPV HI-RISK TYP POOLED RSLT: CPT | Performed by: FAMILY MEDICINE

## 2018-02-14 PROCEDURE — 36415 COLL VENOUS BLD VENIPUNCTURE: CPT | Performed by: FAMILY MEDICINE

## 2018-02-14 PROCEDURE — 90670 PCV13 VACCINE IM: CPT | Performed by: FAMILY MEDICINE

## 2018-02-14 PROCEDURE — 90471 IMMUNIZATION ADMIN: CPT | Performed by: FAMILY MEDICINE

## 2018-02-14 PROCEDURE — 80061 LIPID PANEL: CPT | Performed by: FAMILY MEDICINE

## 2018-02-14 PROCEDURE — 82306 VITAMIN D 25 HYDROXY: CPT | Performed by: FAMILY MEDICINE

## 2018-02-14 PROCEDURE — 80053 COMPREHEN METABOLIC PANEL: CPT | Performed by: FAMILY MEDICINE

## 2018-02-14 PROCEDURE — 99396 PREV VISIT EST AGE 40-64: CPT | Performed by: FAMILY MEDICINE

## 2018-02-14 PROCEDURE — 88175 CYTOPATH C/V AUTO FLUID REDO: CPT | Performed by: FAMILY MEDICINE

## 2018-02-14 PROCEDURE — 93000 ELECTROCARDIOGRAM COMPLETE: CPT | Performed by: FAMILY MEDICINE

## 2018-02-14 NOTE — H&P
HPI:   Edwin Montejo is a 60year old female who presents for a complete physical exam.  Patient concerns:   C/o vague left sided chest aching, chest feels heavy x 1 week, goes into left shoulder blade, occ into neck.   No recent illness, noticeable at rest GERD (gastroesophageal reflux disease)    • History of blood transfusion 1978   • Hypercholesteremia    • Osteopenia    • Vitamin D deficiency       Past Surgical History:  3/2/11: COLONOSCOPY      Comment: normal  No date: D & C  09/22/2017: DEXA, AXIAL S MUSCULOSKELETAL: denies back or joint pain  NEURO: denies headaches, tremors, dizziness, numbness, tingling, muscular weakness  PSYCHE: denies depression or anxiety, denies any sleep difficulty  HEMATOLOGIC: denies unexplained bruising or bleeding  ENDOC coherent, judgement: good, appearance: neat, Affect:alightly anxious  EKG: NSR, normal tracing  ASSESSMENT AND PLAN:   Marita Rowe is a 60year old female Preventative health care  (primary encounter diagnosis)  Pressure in left side of chest  Osteopenia

## 2018-02-14 NOTE — PATIENT INSTRUCTIONS
I reviewed age-appropriate preventive health screening exams. I discussed age-appropriate immunizations.   PCV–13 given  Patient be due for follow-up colonoscopy 2021  Discussed possible causes of the vague left-sided chest pressure at rest.  Will check ba

## 2018-02-15 DIAGNOSIS — E55.9 VITAMIN D DEFICIENCY: Primary | ICD-10-CM

## 2018-02-17 LAB — HPV I/H RISK 1 DNA SPEC QL NAA+PROBE: NEGATIVE

## 2018-02-20 DIAGNOSIS — R79.89 ABNORMAL LIVER FUNCTION TESTS: Primary | ICD-10-CM

## 2018-02-20 NOTE — PROGRESS NOTES
Discussed elevated labs with patient. Reports nothing new since last visit, no new symptoms. Will repeat in a couple weeks and if remain elevated will favor empiric brief prednisone trial. Labs ordered.

## 2018-02-28 ENCOUNTER — APPOINTMENT (OUTPATIENT)
Dept: LAB | Age: 63
End: 2018-02-28
Attending: FAMILY MEDICINE
Payer: COMMERCIAL

## 2018-02-28 DIAGNOSIS — R79.89 ABNORMAL LIVER FUNCTION TESTS: ICD-10-CM

## 2018-02-28 LAB
ALBUMIN SERPL-MCNC: 3.8 G/DL (ref 3.5–4.8)
ALP LIVER SERPL-CCNC: 69 U/L (ref 50–130)
ALT SERPL-CCNC: 89 U/L (ref 14–54)
AST SERPL-CCNC: 68 U/L (ref 15–41)
BILIRUB DIRECT SERPL-MCNC: 0.1 MG/DL (ref 0.1–0.5)
BILIRUB SERPL-MCNC: 0.7 MG/DL (ref 0.1–2)
M PROTEIN MFR SERPL ELPH: 7.3 G/DL (ref 6.1–8.3)

## 2018-02-28 PROCEDURE — 36415 COLL VENOUS BLD VENIPUNCTURE: CPT

## 2018-02-28 PROCEDURE — 80076 HEPATIC FUNCTION PANEL: CPT

## 2018-03-05 ENCOUNTER — TELEPHONE (OUTPATIENT)
Dept: SURGERY | Facility: CLINIC | Age: 63
End: 2018-03-05

## 2018-03-05 DIAGNOSIS — R74.8 ELEVATED LIVER ENZYMES: Primary | ICD-10-CM

## 2018-03-05 NOTE — TELEPHONE ENCOUNTER
Discussed repeat labs and continued elevation of ALT. Noted that fish oil was resumed in the fall and will see if that has any influence by holding and repeating labs in 1 months.

## 2018-03-28 ENCOUNTER — APPOINTMENT (OUTPATIENT)
Dept: LAB | Age: 63
End: 2018-03-28
Attending: INTERNAL MEDICINE
Payer: COMMERCIAL

## 2018-03-28 DIAGNOSIS — R74.8 ELEVATED LIVER ENZYMES: ICD-10-CM

## 2018-03-28 PROCEDURE — 36415 COLL VENOUS BLD VENIPUNCTURE: CPT

## 2018-03-28 PROCEDURE — 80076 HEPATIC FUNCTION PANEL: CPT

## 2018-04-09 ENCOUNTER — TELEPHONE (OUTPATIENT)
Dept: FAMILY MEDICINE CLINIC | Facility: CLINIC | Age: 63
End: 2018-04-09

## 2018-04-09 DIAGNOSIS — R79.89 ABNORMAL LIVER FUNCTION TESTS: Primary | ICD-10-CM

## 2018-04-09 NOTE — TELEPHONE ENCOUNTER
PATIENT ADVISED. FYI:   [facility] WENT AND SEEN EYE DOCTOR ON April 2 AND WAS DX WITH AN OCCULAR MIGRAINE, THAT HAPPENED MARCH 30. [facility] DOES NOT EVER REMEMBER HAVING THIS BEFORE.     EYE DOCTOR SAID TO MENTION IT TO PCP.

## 2018-06-15 ENCOUNTER — OFFICE VISIT (OUTPATIENT)
Dept: FAMILY MEDICINE CLINIC | Facility: CLINIC | Age: 63
End: 2018-06-15

## 2018-06-15 VITALS
OXYGEN SATURATION: 99 % | SYSTOLIC BLOOD PRESSURE: 124 MMHG | DIASTOLIC BLOOD PRESSURE: 80 MMHG | BODY MASS INDEX: 20 KG/M2 | TEMPERATURE: 99 F | WEIGHT: 130 LBS

## 2018-06-15 DIAGNOSIS — R30.0 DYSURIA: Primary | ICD-10-CM

## 2018-06-15 DIAGNOSIS — N81.10 CYSTOCELE WITH RECTOCELE: ICD-10-CM

## 2018-06-15 DIAGNOSIS — N81.6 CYSTOCELE WITH RECTOCELE: ICD-10-CM

## 2018-06-15 PROCEDURE — 87088 URINE BACTERIA CULTURE: CPT | Performed by: FAMILY MEDICINE

## 2018-06-15 PROCEDURE — 99213 OFFICE O/P EST LOW 20 MIN: CPT | Performed by: FAMILY MEDICINE

## 2018-06-15 PROCEDURE — 87086 URINE CULTURE/COLONY COUNT: CPT | Performed by: FAMILY MEDICINE

## 2018-06-15 PROCEDURE — 87186 SC STD MICRODIL/AGAR DIL: CPT | Performed by: FAMILY MEDICINE

## 2018-06-15 PROCEDURE — 81003 URINALYSIS AUTO W/O SCOPE: CPT | Performed by: FAMILY MEDICINE

## 2018-06-15 RX ORDER — PHENAZOPYRIDINE HYDROCHLORIDE 200 MG/1
200 TABLET, FILM COATED ORAL 3 TIMES DAILY PRN
Qty: 9 TABLET | Refills: 0 | Status: SHIPPED | OUTPATIENT
Start: 2018-06-15 | End: 2019-02-20 | Stop reason: ALTCHOICE

## 2018-06-15 NOTE — PATIENT INSTRUCTIONS
We will send urine for culture, hold antibiotics pending results  Push fluids, cranberry juice or tablets, Pyridium 200 mg 3 times daily as needed dysuria  Patient given referral for Dr. Moy Moulton to discussed definitive treatment of cystocele and inconti

## 2018-06-18 RX ORDER — AMPICILLIN 500 MG/1
500 CAPSULE ORAL 3 TIMES DAILY
Qty: 15 CAPSULE | Refills: 0 | Status: SHIPPED | OUTPATIENT
Start: 2018-06-18 | End: 2018-06-23

## 2018-07-18 ENCOUNTER — APPOINTMENT (OUTPATIENT)
Dept: LAB | Age: 63
End: 2018-07-18
Attending: FAMILY MEDICINE
Payer: COMMERCIAL

## 2018-07-18 DIAGNOSIS — R79.89 ABNORMAL LIVER FUNCTION TESTS: ICD-10-CM

## 2018-07-18 LAB
ALBUMIN SERPL-MCNC: 3.7 G/DL (ref 3.5–4.8)
ALP LIVER SERPL-CCNC: 64 U/L (ref 50–130)
ALT SERPL-CCNC: 23 U/L (ref 14–54)
AST SERPL-CCNC: 23 U/L (ref 15–41)
BILIRUB DIRECT SERPL-MCNC: 0.1 MG/DL (ref 0.1–0.5)
BILIRUB SERPL-MCNC: 0.7 MG/DL (ref 0.1–2)
M PROTEIN MFR SERPL ELPH: 7.2 G/DL (ref 6.1–8.3)

## 2018-07-18 PROCEDURE — 36415 COLL VENOUS BLD VENIPUNCTURE: CPT

## 2018-07-18 PROCEDURE — 80076 HEPATIC FUNCTION PANEL: CPT

## 2018-07-22 DIAGNOSIS — R79.89 ELEVATED LIVER FUNCTION TESTS: Primary | ICD-10-CM

## 2018-08-04 ENCOUNTER — MED REC SCAN ONLY (OUTPATIENT)
Dept: FAMILY MEDICINE CLINIC | Facility: CLINIC | Age: 63
End: 2018-08-04

## 2018-10-15 ENCOUNTER — APPOINTMENT (OUTPATIENT)
Dept: LAB | Age: 63
End: 2018-10-15
Attending: FAMILY MEDICINE
Payer: COMMERCIAL

## 2018-10-15 DIAGNOSIS — R79.89 ELEVATED LIVER FUNCTION TESTS: ICD-10-CM

## 2018-10-15 PROCEDURE — 80076 HEPATIC FUNCTION PANEL: CPT

## 2018-10-15 PROCEDURE — 80061 LIPID PANEL: CPT

## 2018-10-15 PROCEDURE — 36415 COLL VENOUS BLD VENIPUNCTURE: CPT

## 2019-02-20 ENCOUNTER — LABORATORY ENCOUNTER (OUTPATIENT)
Dept: LAB | Age: 64
End: 2019-02-20
Attending: FAMILY MEDICINE
Payer: COMMERCIAL

## 2019-02-20 ENCOUNTER — OFFICE VISIT (OUTPATIENT)
Dept: FAMILY MEDICINE CLINIC | Facility: CLINIC | Age: 64
End: 2019-02-20
Payer: COMMERCIAL

## 2019-02-20 ENCOUNTER — PATIENT MESSAGE (OUTPATIENT)
Dept: FAMILY MEDICINE CLINIC | Facility: CLINIC | Age: 64
End: 2019-02-20

## 2019-02-20 VITALS
BODY MASS INDEX: 19.7 KG/M2 | WEIGHT: 130 LBS | HEART RATE: 89 BPM | HEIGHT: 68 IN | DIASTOLIC BLOOD PRESSURE: 88 MMHG | OXYGEN SATURATION: 98 % | SYSTOLIC BLOOD PRESSURE: 136 MMHG | TEMPERATURE: 98 F

## 2019-02-20 DIAGNOSIS — E78.00 HYPERCHOLESTEREMIA: ICD-10-CM

## 2019-02-20 DIAGNOSIS — R42 ORTHOSTATIC LIGHTHEADEDNESS: ICD-10-CM

## 2019-02-20 DIAGNOSIS — M85.852 OSTEOPENIA OF LEFT THIGH: ICD-10-CM

## 2019-02-20 DIAGNOSIS — E55.9 VITAMIN D DEFICIENCY: ICD-10-CM

## 2019-02-20 DIAGNOSIS — Z12.31 ENCOUNTER FOR SCREENING MAMMOGRAM FOR BREAST CANCER: ICD-10-CM

## 2019-02-20 DIAGNOSIS — R74.8 ELEVATED LIVER ENZYMES: ICD-10-CM

## 2019-02-20 DIAGNOSIS — Z13.0 SCREENING, ANEMIA, DEFICIENCY, IRON: ICD-10-CM

## 2019-02-20 DIAGNOSIS — Z00.00 PREVENTATIVE HEALTH CARE: Primary | ICD-10-CM

## 2019-02-20 LAB
ALBUMIN SERPL-MCNC: 4.4 G/DL (ref 3.4–5)
ALBUMIN/GLOB SERPL: 1.2 {RATIO} (ref 1–2)
ALP LIVER SERPL-CCNC: 67 U/L (ref 50–130)
ALT SERPL-CCNC: 19 U/L (ref 13–56)
ANION GAP SERPL CALC-SCNC: 6 MMOL/L (ref 0–18)
AST SERPL-CCNC: 22 U/L (ref 15–37)
BASOPHILS # BLD AUTO: 0.1 X10(3) UL (ref 0–0.2)
BASOPHILS NFR BLD AUTO: 1.2 %
BILIRUB SERPL-MCNC: 0.8 MG/DL (ref 0.1–2)
BUN BLD-MCNC: 14 MG/DL (ref 7–18)
BUN/CREAT SERPL: 15.6 (ref 10–20)
CALCIUM BLD-MCNC: 9.3 MG/DL (ref 8.5–10.1)
CHLORIDE SERPL-SCNC: 107 MMOL/L (ref 98–107)
CO2 SERPL-SCNC: 29 MMOL/L (ref 21–32)
CREAT BLD-MCNC: 0.9 MG/DL (ref 0.55–1.02)
DEPRECATED RDW RBC AUTO: 43.9 FL (ref 35.1–46.3)
EOSINOPHIL # BLD AUTO: 0.33 X10(3) UL (ref 0–0.7)
EOSINOPHIL NFR BLD AUTO: 4 %
ERYTHROCYTE [DISTWIDTH] IN BLOOD BY AUTOMATED COUNT: 13.4 % (ref 11–15)
GLOBULIN PLAS-MCNC: 3.6 G/DL (ref 2.8–4.4)
GLUCOSE BLD-MCNC: 99 MG/DL (ref 70–99)
HCT VFR BLD AUTO: 43.6 % (ref 35–48)
HGB BLD-MCNC: 14.2 G/DL (ref 12–16)
IMM GRANULOCYTES # BLD AUTO: 0.02 X10(3) UL (ref 0–1)
IMM GRANULOCYTES NFR BLD: 0.2 %
LYMPHOCYTES # BLD AUTO: 2.8 X10(3) UL (ref 1–4)
LYMPHOCYTES NFR BLD AUTO: 34.2 %
M PROTEIN MFR SERPL ELPH: 8 G/DL (ref 6.4–8.2)
MCH RBC QN AUTO: 28.9 PG (ref 26–34)
MCHC RBC AUTO-ENTMCNC: 32.6 G/DL (ref 31–37)
MCV RBC AUTO: 88.6 FL (ref 80–100)
MONOCYTES # BLD AUTO: 0.51 X10(3) UL (ref 0.1–1)
MONOCYTES NFR BLD AUTO: 6.2 %
NEUTROPHILS # BLD AUTO: 4.42 X10 (3) UL (ref 1.5–7.7)
NEUTROPHILS # BLD AUTO: 4.42 X10(3) UL (ref 1.5–7.7)
NEUTROPHILS NFR BLD AUTO: 54.2 %
OSMOLALITY SERPL CALC.SUM OF ELEC: 295 MOSM/KG (ref 275–295)
PLATELET # BLD AUTO: 244 10(3)UL (ref 150–450)
POTASSIUM SERPL-SCNC: 4.8 MMOL/L (ref 3.5–5.1)
RBC # BLD AUTO: 4.92 X10(6)UL (ref 3.8–5.3)
SODIUM SERPL-SCNC: 142 MMOL/L (ref 136–145)
VIT D+METAB SERPL-MCNC: 73.5 NG/ML (ref 30–100)
WBC # BLD AUTO: 8.2 X10(3) UL (ref 4–11)

## 2019-02-20 PROCEDURE — 85025 COMPLETE CBC W/AUTO DIFF WBC: CPT | Performed by: FAMILY MEDICINE

## 2019-02-20 PROCEDURE — 82306 VITAMIN D 25 HYDROXY: CPT | Performed by: FAMILY MEDICINE

## 2019-02-20 PROCEDURE — 36415 COLL VENOUS BLD VENIPUNCTURE: CPT | Performed by: FAMILY MEDICINE

## 2019-02-20 PROCEDURE — 99396 PREV VISIT EST AGE 40-64: CPT | Performed by: FAMILY MEDICINE

## 2019-02-20 PROCEDURE — 80053 COMPREHEN METABOLIC PANEL: CPT | Performed by: FAMILY MEDICINE

## 2019-02-20 NOTE — PATIENT INSTRUCTIONS
I reviewed age-appropriate screening exams as well as advanced directives. I discussed age-appropriate immunizations.  Recommend PCV-23 @ 71  Patient will schedule annual mammogram after April 9  Would repeat bone density testing this fall  Would recommend

## 2019-02-20 NOTE — TELEPHONE ENCOUNTER
From: Marcela Isaacs  To: Daina Andrade., DO  Sent: 2/20/2019 2:48 PM CST  Subject: Prescription Question    I told the nurse this morning that I am now using Estradiol cream at the dose of 1/2 gram two times a week, but I don't see it on the [facility]

## 2019-02-20 NOTE — H&P
HPI:   Chema Hobbs is a 58year old female who presents for a complete physical exam.  Patient concerns:   Feeling \"lightheaded\" more often, worse coming up from bending over in yoga class .    Patient admits that she probably is limiting her fluids to Hypercholesteremia    • Osteopenia    • Vitamin D deficiency       Past Surgical History:   Procedure Laterality Date   • COLONOSCOPY  3/2/11    normal   • D & C     • DEXA, AXIAL SITE (SPINE, HIPS, PELVIS)  09/22/2017    T-score -2.3 total hip, -1.9 FN bleeding, + less stess incontinence , occ mild night sweats , going to pelvic floor therapy  MUSCULOSKELETAL: denies back or joint pain-back pain improved since physical therapy  NEURO: denies headaches, tremors, dizziness, numbness, tingling, muscular wea diagnosis)  Vitamin d deficiency  Osteopenia of left thigh  Hypercholesteremia-favorable total to hdl ratio  Encounter for screening mammogram for breast cancer  Elevated liver enzymes-spontaneous resolution  Screening, anemia, deficiency, iron  Orthostati

## 2019-02-21 RX ORDER — ESTRADIOL 0.1 MG/G
0.5 CREAM VAGINAL
Qty: 1 TUBE | Refills: 0 | COMMUNITY
Start: 2019-02-21 | End: 2020-08-24

## 2019-04-12 ENCOUNTER — MED REC SCAN ONLY (OUTPATIENT)
Dept: FAMILY MEDICINE CLINIC | Facility: CLINIC | Age: 64
End: 2019-04-12

## 2019-04-12 ENCOUNTER — TELEPHONE (OUTPATIENT)
Dept: FAMILY MEDICINE CLINIC | Facility: CLINIC | Age: 64
End: 2019-04-12

## 2019-05-29 ENCOUNTER — OFFICE VISIT (OUTPATIENT)
Dept: FAMILY MEDICINE CLINIC | Facility: CLINIC | Age: 64
End: 2019-05-29
Payer: MEDICARE

## 2019-05-29 VITALS
BODY MASS INDEX: 20 KG/M2 | DIASTOLIC BLOOD PRESSURE: 82 MMHG | WEIGHT: 133 LBS | HEART RATE: 62 BPM | SYSTOLIC BLOOD PRESSURE: 136 MMHG | OXYGEN SATURATION: 99 % | TEMPERATURE: 98 F

## 2019-05-29 DIAGNOSIS — R35.0 FREQUENCY OF URINATION: Primary | ICD-10-CM

## 2019-05-29 PROCEDURE — 87086 URINE CULTURE/COLONY COUNT: CPT | Performed by: FAMILY MEDICINE

## 2019-05-29 PROCEDURE — 81003 URINALYSIS AUTO W/O SCOPE: CPT | Performed by: FAMILY MEDICINE

## 2019-05-29 PROCEDURE — 99213 OFFICE O/P EST LOW 20 MIN: CPT | Performed by: FAMILY MEDICINE

## 2019-05-29 RX ORDER — AMPICILLIN 500 MG/1
500 CAPSULE ORAL 3 TIMES DAILY
Qty: 15 CAPSULE | Refills: 0 | Status: SHIPPED | OUTPATIENT
Start: 2019-05-29 | End: 2019-06-03

## 2019-05-29 NOTE — PROGRESS NOTES
Maritza Driscoll is a 71year old female. Patient presents with:  Frequency: for a couple day    HPI:   C/o freq of urination x 2-3 days, lower abd discomfort, +urgency, no fever, chills, no blood in urine    Current Outpatient Medications:  Estradiol 0.1 MG/G thyromegaly  LUNGS: clear to auscultation, no w/r/r  CARDIO: RRR without murmur, peripheral pulses intact  GI: good BS's,no masses, HSM , +suprapubic discomfort to firm palpation    Recent Results (from the past 24 hour(s))   URINALYSIS, AUTO, W/O SCOPE

## 2019-05-29 NOTE — PATIENT INSTRUCTIONS
We will send urine for culture, push fluids, void after sex  Ampicillin 500 mg 3 times daily x 5 days pending results of urine culture

## 2019-07-23 ENCOUNTER — OFFICE VISIT (OUTPATIENT)
Dept: FAMILY MEDICINE CLINIC | Facility: CLINIC | Age: 64
End: 2019-07-23
Payer: MEDICARE

## 2019-07-23 VITALS
SYSTOLIC BLOOD PRESSURE: 120 MMHG | DIASTOLIC BLOOD PRESSURE: 82 MMHG | HEART RATE: 72 BPM | BODY MASS INDEX: 19.63 KG/M2 | RESPIRATION RATE: 12 BRPM | WEIGHT: 129.5 LBS | HEIGHT: 68 IN | TEMPERATURE: 98 F

## 2019-07-23 DIAGNOSIS — H69.83 ETD (EUSTACHIAN TUBE DYSFUNCTION), BILATERAL: Primary | ICD-10-CM

## 2019-07-23 PROCEDURE — 99213 OFFICE O/P EST LOW 20 MIN: CPT | Performed by: FAMILY MEDICINE

## 2019-07-23 RX ORDER — PREDNISONE 20 MG/1
TABLET ORAL
Qty: 9 TABLET | Refills: 0 | Status: SHIPPED | OUTPATIENT
Start: 2019-07-23 | End: 2020-02-14 | Stop reason: ALTCHOICE

## 2019-07-23 NOTE — PROGRESS NOTES
Jose Angel Layne is a 71year old female.   Patient presents with:  Ear Problem: .inrm 6      Chief Complaint Reviewed and Verified  Nursing Notes Reviewed and   Verified  Tobacco Reviewed  Allergies Reviewed  Medications Reviewed    Problem List Reviewed  Med : 136/88  06/15/18 : 124/80  02/14/18 : 120/72  10/17/17 : 138/80      Wt Readings from Last 6 Encounters:  07/23/19 : 129 lb 8 oz  05/29/19 : 133 lb  02/20/19 : 130 lb  06/15/18 : 130 lb  02/14/18 : 133 lb  10/17/17 : 131 lb      REVIEW OF SYSTEMS:   GENE

## 2019-09-20 ENCOUNTER — TELEPHONE (OUTPATIENT)
Dept: FAMILY MEDICINE CLINIC | Facility: CLINIC | Age: 64
End: 2019-09-20

## 2019-09-20 DIAGNOSIS — M85.859 OSTEOPENIA OF HIP, UNSPECIFIED LATERALITY: ICD-10-CM

## 2019-09-20 DIAGNOSIS — M85.80 OSTEOPENIA, UNSPECIFIED LOCATION: Primary | ICD-10-CM

## 2019-09-20 DIAGNOSIS — Z78.0 POST-MENOPAUSAL: ICD-10-CM

## 2019-09-20 NOTE — TELEPHONE ENCOUNTER
Spoke with patient and informed her to call central scheduling to schedule a place and time. However, this nurse asked her to wait as an order needs to be placed by provider.  Will have Dr. Joselyn Goodell place order on Monday upon his return to office, then will

## 2019-09-26 ENCOUNTER — HOSPITAL ENCOUNTER (OUTPATIENT)
Dept: BONE DENSITY | Age: 64
Discharge: HOME | End: 2019-09-26
Attending: NURSE PRACTITIONER
Payer: MEDICARE

## 2019-09-26 DIAGNOSIS — M85.859 OSTEOPENIA OF HIP, UNSPECIFIED LATERALITY: ICD-10-CM

## 2019-09-26 DIAGNOSIS — Z78.0 POST-MENOPAUSAL: ICD-10-CM

## 2019-09-26 PROCEDURE — 77080 DXA BONE DENSITY AXIAL: CPT | Performed by: NURSE PRACTITIONER

## 2020-02-21 ENCOUNTER — OFFICE VISIT (OUTPATIENT)
Dept: FAMILY MEDICINE CLINIC | Facility: CLINIC | Age: 65
End: 2020-02-21
Payer: MEDICARE

## 2020-02-21 VITALS
TEMPERATURE: 98 F | BODY MASS INDEX: 19.55 KG/M2 | HEIGHT: 68 IN | HEART RATE: 89 BPM | OXYGEN SATURATION: 98 % | SYSTOLIC BLOOD PRESSURE: 134 MMHG | DIASTOLIC BLOOD PRESSURE: 84 MMHG | WEIGHT: 129 LBS

## 2020-02-21 DIAGNOSIS — Z12.31 VISIT FOR SCREENING MAMMOGRAM: ICD-10-CM

## 2020-02-21 DIAGNOSIS — Z23 NEED FOR VACCINATION: ICD-10-CM

## 2020-02-21 DIAGNOSIS — Z00.00 LABORATORY EXAM ORDERED AS PART OF ROUTINE GENERAL MEDICAL EXAMINATION: ICD-10-CM

## 2020-02-21 DIAGNOSIS — Z00.00 ENCOUNTER FOR ANNUAL HEALTH EXAMINATION: Primary | ICD-10-CM

## 2020-02-21 DIAGNOSIS — N32.81 OVERACTIVE BLADDER DUE TO PROLAPSE OF FEMALE GENITAL ORGAN: ICD-10-CM

## 2020-02-21 DIAGNOSIS — Z80.0 FAMILY HISTORY OF COLON CANCER IN FATHER: ICD-10-CM

## 2020-02-21 DIAGNOSIS — Z78.0 POST-MENOPAUSAL: ICD-10-CM

## 2020-02-21 DIAGNOSIS — N81.6 CYSTOCELE WITH RECTOCELE: ICD-10-CM

## 2020-02-21 DIAGNOSIS — N81.9 OVERACTIVE BLADDER DUE TO PROLAPSE OF FEMALE GENITAL ORGAN: ICD-10-CM

## 2020-02-21 DIAGNOSIS — N81.10 CYSTOCELE WITH RECTOCELE: ICD-10-CM

## 2020-02-21 DIAGNOSIS — M85.852 OSTEOPENIA OF LEFT HIP: ICD-10-CM

## 2020-02-21 DIAGNOSIS — L30.8 OTHER ECZEMA: ICD-10-CM

## 2020-02-21 DIAGNOSIS — E78.00 HYPERCHOLESTEREMIA: ICD-10-CM

## 2020-02-21 PROCEDURE — G0402 INITIAL PREVENTIVE EXAM: HCPCS | Performed by: FAMILY MEDICINE

## 2020-02-21 PROCEDURE — G0009 ADMIN PNEUMOCOCCAL VACCINE: HCPCS | Performed by: FAMILY MEDICINE

## 2020-02-21 PROCEDURE — 90732 PPSV23 VACC 2 YRS+ SUBQ/IM: CPT | Performed by: FAMILY MEDICINE

## 2020-02-21 NOTE — PATIENT INSTRUCTIONS
Irma Moya's SCREENING SCHEDULE   Tests on this list are recommended by your physician but may not be covered, or covered at this frequency, by your insurer. Please check with your insurance carrier before scheduling to verify coverage.    PREVENTATIVE more often if abnormal Colonoscopy due on 03/02/2021 Update Health Maintenance if applicable    Flex Sigmoidoscopy Screen  Covered every 5 years No results found for this or any previous visit. No flowsheet data found.      Fecal Occult Blood   Covered Dariana Pneumococcal 23 (Pneumovax)  Covered Once after 72 Orders placed or performed in visit on 02/21/20   • PNEUMOCOCCAL IMM (PNEUMOVAX)    Please get once after your 65th birthday    Hepatitis B for Moderate/High Risk       No orders found for this or any prev ([id number]=[id number]); Future    3. Need for vaccination  Reviewed age-appropriate medications. PCV–23 given, patient referred to local pharmacy for Tdap booster due to cost savings  - PNEUMOCOCCAL IMM (PNEUMOVAX)    4.  Osteopenia of left hip- DEXA 9/20/19, T-score

## 2020-02-21 NOTE — H&P
HPI:   Marita Rowe is a 71year old female Patient presents with:  Physical: Medicare AWV    Wt Readings from Last 6 Encounters:  02/21/20 : 129 lb (58.5 kg)  07/23/19 : 129 lb 8 oz (58.7 kg)  05/29/19 : 133 lb (60.3 kg)  02/20/19 : 130 lb (59 kg)  06/15 • DEXA, AXIAL SITE (SPINE, HIPS, PELVIS)  09/22/2017    T-score -2.3 total hip, -1.9 FN   • DEXA, AXIAL SITE (SPINE, HIPS, PELVIS)  09/20/2019    T score is -2.4 at femoral neck   • LASIK  1998   • NEEDLE BIOPSY LIVER  08/09/2017   • OTHER SURGICAL HIS bleeding, + less stess incontinence , occ mild night sweats , using topical ERT 2 x weekly, doing pelvic floor HEP  MUSCULOSKELETAL: denies back or joint pain-back pain improved w/ HEP  NEURO: denies headaches, tremors, dizziness, numbness, tingling, muscu peripheral edema  GI: good BS's,no masses, HSM , nontender  : Deferred to uro-gynecologist  MUSCULOSKELETAL: back is non-tender,FROM of the back, flexion: fingers to floor  EXTREMITIES: FROM of all joints tested,  no cyanosis, clubbing or edema, no varic data found.     Fasting Blood Sugar (FSB)   Patient must be diagnosed with one of the following:   • Hypertension   • Dyslipidemia   • Obesity (BMI ³30 kg/m2)   • Previous elevated impaired FBS or GTT   … or any two of the following:   • Overweight (BMI ³25 Covered annually for Diabetics, people with Glaucoma family history,   Americans over age 48   Americans over age 72 No flowsheet data found.  OK to schedule if you are in this risk group, make sure you have a referral   Bone Density Screen live in the same house as a HepB virus carrier   Homosexual men   Illicit injectable drug abusers     Tetanus Toxoid- Only covered with a cut with metal- TD and TDaP Not covered by Medicare Part B) No orders found for this or any previous visit.  This may b for fasting labs  - VENIPUNCTURE  - LIPID PANEL; Future    6. Post-menopausal  Monitor clinically    7. Cystocele with rectocele  Continue pelvic floor exercises, monitor clinically    8.  Overactive bladder due to prolapse of female genital organ  Continue

## 2020-02-24 ENCOUNTER — APPOINTMENT (OUTPATIENT)
Dept: LAB | Age: 65
End: 2020-02-24
Attending: FAMILY MEDICINE
Payer: MEDICARE

## 2020-02-24 DIAGNOSIS — E78.00 HYPERCHOLESTEREMIA: ICD-10-CM

## 2020-02-24 DIAGNOSIS — Z00.00 LABORATORY EXAM ORDERED AS PART OF ROUTINE GENERAL MEDICAL EXAMINATION: ICD-10-CM

## 2020-02-24 LAB
ALBUMIN SERPL-MCNC: 3.6 G/DL (ref 3.4–5)
ALBUMIN/GLOB SERPL: 0.9 {RATIO} (ref 1–2)
ALP LIVER SERPL-CCNC: 64 U/L (ref 50–130)
ALT SERPL-CCNC: 18 U/L (ref 13–56)
ANION GAP SERPL CALC-SCNC: 3 MMOL/L (ref 0–18)
AST SERPL-CCNC: 19 U/L (ref 15–37)
BILIRUB SERPL-MCNC: 0.6 MG/DL (ref 0.1–2)
BUN BLD-MCNC: 13 MG/DL (ref 7–18)
BUN/CREAT SERPL: 12.7 (ref 10–20)
CALCIUM BLD-MCNC: 9.2 MG/DL (ref 8.5–10.1)
CHLORIDE SERPL-SCNC: 104 MMOL/L (ref 98–112)
CHOLEST SMN-MCNC: 257 MG/DL (ref ?–200)
CO2 SERPL-SCNC: 28 MMOL/L (ref 21–32)
CREAT BLD-MCNC: 1.02 MG/DL (ref 0.55–1.02)
GLOBULIN PLAS-MCNC: 3.8 G/DL (ref 2.8–4.4)
GLUCOSE BLD-MCNC: 95 MG/DL (ref 70–99)
HDLC SERPL-MCNC: 61 MG/DL (ref 40–59)
LDLC SERPL CALC-MCNC: 174 MG/DL (ref ?–100)
M PROTEIN MFR SERPL ELPH: 7.4 G/DL (ref 6.4–8.2)
NONHDLC SERPL-MCNC: 196 MG/DL (ref ?–130)
OSMOLALITY SERPL CALC.SUM OF ELEC: 280 MOSM/KG (ref 275–295)
PATIENT FASTING Y/N/NP: YES
PATIENT FASTING Y/N/NP: YES
POTASSIUM SERPL-SCNC: 5.1 MMOL/L (ref 3.5–5.1)
SODIUM SERPL-SCNC: 135 MMOL/L (ref 136–145)
TRIGL SERPL-MCNC: 109 MG/DL (ref 30–149)
VLDLC SERPL CALC-MCNC: 22 MG/DL (ref 0–30)

## 2020-02-24 PROCEDURE — 36415 COLL VENOUS BLD VENIPUNCTURE: CPT

## 2020-02-24 PROCEDURE — 80053 COMPREHEN METABOLIC PANEL: CPT

## 2020-02-24 PROCEDURE — 80061 LIPID PANEL: CPT

## 2020-02-27 ENCOUNTER — TELEPHONE (OUTPATIENT)
Dept: FAMILY MEDICINE CLINIC | Facility: CLINIC | Age: 65
End: 2020-02-27

## 2020-05-04 ENCOUNTER — E-VISIT (OUTPATIENT)
Dept: FAMILY MEDICINE CLINIC | Facility: CLINIC | Age: 65
End: 2020-05-04

## 2020-05-04 DIAGNOSIS — R39.9 UTI SYMPTOMS: Primary | ICD-10-CM

## 2020-05-04 PROCEDURE — 99421 OL DIG E/M SVC 5-10 MIN: CPT | Performed by: NURSE PRACTITIONER

## 2020-05-04 RX ORDER — AMOXICILLIN 875 MG/1
875 TABLET, COATED ORAL 2 TIMES DAILY
Qty: 14 TABLET | Refills: 0 | Status: SHIPPED | OUTPATIENT
Start: 2020-05-04 | End: 2020-05-11

## 2020-05-05 ENCOUNTER — HOSPITAL ENCOUNTER (OUTPATIENT)
Dept: CT IMAGING | Age: 65
Discharge: HOME | End: 2020-05-05
Attending: FAMILY MEDICINE

## 2020-05-05 DIAGNOSIS — Z13.9 ENCOUNTER FOR SCREENING: ICD-10-CM

## 2020-05-07 NOTE — PROGRESS NOTES
Milena Jeronimo is a 76year old female. HPI:   See answers to questions above. Current Outpatient Medications   Medication Sig Dispense Refill   • amoxicillin 875 MG Oral Tab Take 1 tablet (875 mg total) by mouth 2 (two) times daily for 7 days.  14 tabl ASSESSMENT AND PLAN:     Uti symptoms  (primary encounter diagnosis)        Meds & Refills for this Visit:  Requested Prescriptions     Signed Prescriptions Disp Refills   • amoxicillin 875 MG Oral Tab 14 tablet 0     Sig: Take 1 tablet (875 mg total)

## 2020-06-01 ENCOUNTER — TELEPHONE (OUTPATIENT)
Dept: FAMILY MEDICINE CLINIC | Facility: CLINIC | Age: 65
End: 2020-06-01

## 2020-08-01 ENCOUNTER — E-VISIT (OUTPATIENT)
Dept: FAMILY MEDICINE CLINIC | Facility: CLINIC | Age: 65
End: 2020-08-01

## 2020-08-01 DIAGNOSIS — R19.5 LOOSE STOOLS: Primary | ICD-10-CM

## 2020-08-01 PROCEDURE — 99421 OL DIG E/M SVC 5-10 MIN: CPT | Performed by: NURSE PRACTITIONER

## 2020-08-01 RX ORDER — ATORVASTATIN CALCIUM 10 MG/1
10 TABLET, FILM COATED ORAL DAILY
COMMUNITY
End: 2020-08-28

## 2020-08-01 NOTE — PROGRESS NOTES
Oly King is a 76year old female. HPI:   See answers to questions above. Current Outpatient Medications   Medication Sig Dispense Refill   • Amino Acids (DAILY AMINO OR) Take 500 mg by mouth.      • atorvastatin 10 MG Oral Tab Take 10 mg by mout Smoker      Smokeless tobacco: Never Used    Alcohol use: No    Drug use: No        ASSESSMENT AND PLAN:     Loose stools  (primary encounter diagnosis)        Meds & Refills for this Visit:  Requested Prescriptions      No prescriptions requested or order

## 2020-08-01 NOTE — PATIENT INSTRUCTIONS
Diet for Vomiting or Diarrhea (Adult)     Your symptoms may return or get worse after eating certain foods listed below. If this happens, stop eating these foods until your symptoms ease and you feel better.   Once the vomiting stops, follow the steps bel © 0474-0264 [employer] [address] [facility], [facility] [place]. All rights reserved. This information is not intended as a substitute for professional medical care. Always follow your healthcare professional's instructions.

## 2020-08-25 RX ORDER — ESTRADIOL 0.1 MG/G
0.5 CREAM VAGINAL
Qty: 1 TUBE | Refills: 0 | Status: SHIPPED | OUTPATIENT
Start: 2020-08-27

## 2020-08-25 NOTE — TELEPHONE ENCOUNTER
Last OV: 2/21/20  Last labs: 2/24/20    No future appointments.     Due for OV-- [employer] message sent

## 2020-08-28 ENCOUNTER — OFFICE VISIT (OUTPATIENT)
Dept: FAMILY MEDICINE CLINIC | Facility: CLINIC | Age: 65
End: 2020-08-28
Payer: MEDICARE

## 2020-08-28 VITALS
HEIGHT: 68 IN | HEART RATE: 87 BPM | OXYGEN SATURATION: 98 % | WEIGHT: 127 LBS | SYSTOLIC BLOOD PRESSURE: 118 MMHG | TEMPERATURE: 96 F | DIASTOLIC BLOOD PRESSURE: 70 MMHG | BODY MASS INDEX: 19.25 KG/M2 | RESPIRATION RATE: 16 BRPM

## 2020-08-28 DIAGNOSIS — E78.00 HYPERCHOLESTEREMIA: Primary | ICD-10-CM

## 2020-08-28 DIAGNOSIS — I25.10 ASHD (ARTERIOSCLEROTIC HEART DISEASE): ICD-10-CM

## 2020-08-28 DIAGNOSIS — G47.62 NOCTURNAL LEG CRAMPS: ICD-10-CM

## 2020-08-28 PROCEDURE — 99213 OFFICE O/P EST LOW 20 MIN: CPT | Performed by: FAMILY MEDICINE

## 2020-08-28 NOTE — PATIENT INSTRUCTIONS
I reviewed goals for lipids. I discussed the findings of the heart scan. I discussed risks, benefits, side effects and anticipated response of statin therapy.   I discussed the presence of mild atherosclerosis and slight increased risk of cardiovascular e

## 2020-08-28 NOTE — PROGRESS NOTES
Ephraim Mendoza is a 76year old female. Patient presents with:   Follow - Up: 6 month follow up meds    HPI:   No concerns  Current Outpatient Medications   Medication Sig Dispense Refill   • Estradiol 0.1 MG/GM Vaginal Cream Place 0.5 g vaginally twice a wee hydrated  SKIN: no rashes,no suspicious lesions  NECK: supple,no adenopathy,no bruits, no thyromegaly  LUNGS: clear to auscultation, no w/r/r  CARDIO: RRR without murmur, peripheral pulses intact  GI: good BS's,no masses, HSM or tenderness  EXTREMITIES: FR

## 2020-09-14 ENCOUNTER — MED REC SCAN ONLY (OUTPATIENT)
Dept: FAMILY MEDICINE CLINIC | Facility: CLINIC | Age: 65
End: 2020-09-14

## 2020-09-23 ENCOUNTER — OFFICE VISIT (OUTPATIENT)
Dept: FAMILY MEDICINE CLINIC | Facility: CLINIC | Age: 65
End: 2020-09-23
Payer: MEDICARE

## 2020-09-23 VITALS
TEMPERATURE: 98 F | BODY MASS INDEX: 20 KG/M2 | OXYGEN SATURATION: 98 % | WEIGHT: 130 LBS | HEART RATE: 76 BPM | SYSTOLIC BLOOD PRESSURE: 132 MMHG | DIASTOLIC BLOOD PRESSURE: 80 MMHG

## 2020-09-23 DIAGNOSIS — R07.89 CHEST WALL DISCOMFORT: ICD-10-CM

## 2020-09-23 DIAGNOSIS — R07.89 ATYPICAL CHEST PAIN: Primary | ICD-10-CM

## 2020-09-23 PROCEDURE — 99213 OFFICE O/P EST LOW 20 MIN: CPT | Performed by: FAMILY MEDICINE

## 2020-09-23 PROCEDURE — 1111F DSCHRG MED/CURRENT MED MERGE: CPT | Performed by: FAMILY MEDICINE

## 2020-09-23 NOTE — PATIENT INSTRUCTIONS
ER and hospital records reviewed including consultations, lab and stress testing. I discussed etiology of pain being related to musculoskeletal forces. Patient may use moist heat, she was instructed in chest wall stretches as well as scapular stretches.

## 2020-09-23 NOTE — PROGRESS NOTES
HPI:    Patient ID: Ryan Abrams is a 76year old female.     Patient presents with:  Hospital F/U: chest pain    Patient was admitted overnight to [facility] for atypical chest pain on 9/19, same-day discharge  Diagnosis atypical chest pain and G Current Outpatient Medications   Medication Sig Dispense Refill   • Estradiol 0.1 MG/GM Vaginal Cream Place 0.5 g vaginally twice a week. 1 Tube 0   • Amino Acids (DAILY AMINO OR) Take 500 mg by mouth.      • aspirin 81 MG Oral Tab Take 1 tablet (81 mg tota Patient may use ibuprofen up to 800 mg 3 times daily taken with food as needed for pain or discomfort  Activity as tolerated      Bartholomew Lundborg. DO Sona, NAYEFP    No orders of the defined types were placed in this encounter.       Meds This Visit:  Requeste

## 2020-10-13 ENCOUNTER — OFFICE VISIT (OUTPATIENT)
Dept: FAMILY MEDICINE CLINIC | Facility: CLINIC | Age: 65
End: 2020-10-13
Payer: MEDICARE

## 2020-10-13 ENCOUNTER — HOSPITAL ENCOUNTER (OUTPATIENT)
Dept: GENERAL RADIOLOGY | Age: 65
Discharge: HOME | End: 2020-10-13
Attending: FAMILY MEDICINE
Payer: MEDICARE

## 2020-10-13 VITALS
TEMPERATURE: 99 F | DIASTOLIC BLOOD PRESSURE: 80 MMHG | SYSTOLIC BLOOD PRESSURE: 110 MMHG | OXYGEN SATURATION: 98 % | HEART RATE: 88 BPM | WEIGHT: 129 LBS | BODY MASS INDEX: 20 KG/M2

## 2020-10-13 DIAGNOSIS — M25.571 ACUTE RIGHT ANKLE PAIN: ICD-10-CM

## 2020-10-13 DIAGNOSIS — M25.572 ACUTE LEFT ANKLE PAIN: ICD-10-CM

## 2020-10-13 DIAGNOSIS — M25.471 RIGHT ANKLE EFFUSION: ICD-10-CM

## 2020-10-13 DIAGNOSIS — M25.572 ACUTE LEFT ANKLE PAIN: Primary | ICD-10-CM

## 2020-10-13 PROCEDURE — 99213 OFFICE O/P EST LOW 20 MIN: CPT | Performed by: FAMILY MEDICINE

## 2020-10-13 PROCEDURE — 73610 X-RAY EXAM OF ANKLE: CPT | Performed by: FAMILY MEDICINE

## 2020-10-13 RX ORDER — MELOXICAM 15 MG/1
15 TABLET ORAL DAILY
Qty: 30 TABLET | Refills: 0 | Status: SHIPPED | OUTPATIENT
Start: 2020-10-13 | End: 2021-10-22

## 2020-10-13 NOTE — PATIENT INSTRUCTIONS
Would recommend a trial of nonsteroidal anti-inflammatory meds such as meloxicam 15 mg every morning with breakfast for the next week. If symptoms do not improve or improve but then recur, would recommend checking labs for inflammatory process.   Discussed

## 2020-10-13 NOTE — PROGRESS NOTES
Nan Fernández is a 76year old female. Patient presents with:  Burning Feet: both    HPI:   Pt reports front of right ankle felt \"burning\" several weeks, then left ankle felt same way, pt turned left ankle 10 days ago, no swelling  Worse in am, better w/ rashes,no suspicious lesions  ENT: TMs: intact, good mobility, Nose: turbinates clear, no dc, Throat: no erythema, pnd, or lesions  NECK: supple,no adenopathy,no bruits, no thyromegaly  LUNGS: clear to auscultation, no w/r/r  CARDIO: RRR without murmur, pe

## 2020-10-14 RX ORDER — MELOXICAM 15 MG/1
TABLET ORAL
Qty: 90 TABLET | Refills: 0 | OUTPATIENT
Start: 2020-10-14

## 2021-03-04 ENCOUNTER — LAB ENCOUNTER (OUTPATIENT)
Dept: LAB | Age: 66
End: 2021-03-04
Attending: FAMILY MEDICINE
Payer: MEDICARE

## 2021-03-04 ENCOUNTER — OFFICE VISIT (OUTPATIENT)
Dept: FAMILY MEDICINE CLINIC | Facility: CLINIC | Age: 66
End: 2021-03-04
Payer: MEDICARE

## 2021-03-04 ENCOUNTER — TELEPHONE (OUTPATIENT)
Dept: FAMILY MEDICINE CLINIC | Facility: CLINIC | Age: 66
End: 2021-03-04

## 2021-03-04 VITALS
HEIGHT: 67.5 IN | TEMPERATURE: 97 F | RESPIRATION RATE: 16 BRPM | SYSTOLIC BLOOD PRESSURE: 168 MMHG | HEART RATE: 84 BPM | WEIGHT: 130.81 LBS | DIASTOLIC BLOOD PRESSURE: 102 MMHG | BODY MASS INDEX: 20.29 KG/M2

## 2021-03-04 DIAGNOSIS — Z00.00 LABORATORY EXAM ORDERED AS PART OF ROUTINE GENERAL MEDICAL EXAMINATION: ICD-10-CM

## 2021-03-04 DIAGNOSIS — Z00.00 ENCOUNTER FOR ANNUAL HEALTH EXAMINATION: Primary | ICD-10-CM

## 2021-03-04 DIAGNOSIS — Z80.0 FAMILY HISTORY OF COLON CANCER IN FATHER: ICD-10-CM

## 2021-03-04 DIAGNOSIS — N81.10 CYSTOCELE WITH RECTOCELE: ICD-10-CM

## 2021-03-04 DIAGNOSIS — M85.852 OSTEOPENIA OF LEFT HIP: ICD-10-CM

## 2021-03-04 DIAGNOSIS — I25.10 ASHD (ARTERIOSCLEROTIC HEART DISEASE): ICD-10-CM

## 2021-03-04 DIAGNOSIS — I10 ESSENTIAL HYPERTENSION: ICD-10-CM

## 2021-03-04 DIAGNOSIS — N81.6 CYSTOCELE WITH RECTOCELE: ICD-10-CM

## 2021-03-04 DIAGNOSIS — E78.00 HYPERCHOLESTEREMIA: ICD-10-CM

## 2021-03-04 DIAGNOSIS — Z12.31 VISIT FOR SCREENING MAMMOGRAM: ICD-10-CM

## 2021-03-04 DIAGNOSIS — Z78.0 POST-MENOPAUSAL: ICD-10-CM

## 2021-03-04 LAB
ALBUMIN SERPL-MCNC: 4.2 G/DL (ref 3.4–5)
ALBUMIN/GLOB SERPL: 1.3 {RATIO} (ref 1–2)
ALP LIVER SERPL-CCNC: 68 U/L
ALT SERPL-CCNC: 24 U/L
ANION GAP SERPL CALC-SCNC: 4 MMOL/L (ref 0–18)
AST SERPL-CCNC: 20 U/L (ref 15–37)
BILIRUB SERPL-MCNC: 0.6 MG/DL (ref 0.1–2)
BUN BLD-MCNC: 13 MG/DL (ref 7–18)
BUN/CREAT SERPL: 12.6 (ref 10–20)
CALCIUM BLD-MCNC: 10.3 MG/DL (ref 8.5–10.1)
CHLORIDE SERPL-SCNC: 104 MMOL/L (ref 98–112)
CHOLEST SMN-MCNC: 302 MG/DL (ref ?–200)
CO2 SERPL-SCNC: 30 MMOL/L (ref 21–32)
CREAT BLD-MCNC: 1.03 MG/DL
GLOBULIN PLAS-MCNC: 3.3 G/DL (ref 2.8–4.4)
GLUCOSE BLD-MCNC: 102 MG/DL (ref 70–99)
HDLC SERPL-MCNC: 65 MG/DL (ref 40–59)
LDLC SERPL CALC-MCNC: 210 MG/DL (ref ?–100)
M PROTEIN MFR SERPL ELPH: 7.5 G/DL (ref 6.4–8.2)
NONHDLC SERPL-MCNC: 237 MG/DL (ref ?–130)
OSMOLALITY SERPL CALC.SUM OF ELEC: 286 MOSM/KG (ref 275–295)
PATIENT FASTING Y/N/NP: YES
PATIENT FASTING Y/N/NP: YES
POTASSIUM SERPL-SCNC: 5.1 MMOL/L (ref 3.5–5.1)
SODIUM SERPL-SCNC: 138 MMOL/L (ref 136–145)
TRIGL SERPL-MCNC: 134 MG/DL (ref 30–149)
VLDLC SERPL CALC-MCNC: 27 MG/DL (ref 0–30)

## 2021-03-04 PROCEDURE — 36415 COLL VENOUS BLD VENIPUNCTURE: CPT

## 2021-03-04 PROCEDURE — 99213 OFFICE O/P EST LOW 20 MIN: CPT | Performed by: FAMILY MEDICINE

## 2021-03-04 PROCEDURE — G0438 PPPS, INITIAL VISIT: HCPCS | Performed by: FAMILY MEDICINE

## 2021-03-04 PROCEDURE — 80061 LIPID PANEL: CPT

## 2021-03-04 PROCEDURE — 80053 COMPREHEN METABOLIC PANEL: CPT

## 2021-03-04 RX ORDER — AMLODIPINE BESYLATE 5 MG/1
5 TABLET ORAL DAILY
Qty: 30 TABLET | Refills: 1 | Status: SHIPPED | OUTPATIENT
Start: 2021-03-04 | End: 2021-04-26

## 2021-03-04 NOTE — PATIENT INSTRUCTIONS
Irma Moya's SCREENING SCHEDULE   Tests on this list are recommended by your physician but may not be covered, or covered at this frequency, by your insurer. Please check with your insurance carrier before scheduling to verify coverage.    PREVENTATIVE abnormal Colonoscopy due on 02/01/2031 Update [facility] if applicable    Flex Sigmoidoscopy Screen  Covered every 5 years No results found for this or any previous visit. No flowsheet data found.      Fecal Occult Blood   Covered Annually No result (Pneumovax)  Covered Once after 65 Orders placed or performed in visit on 02/21/20   • PNEUMOCOCCAL IMM (PNEUMOVAX)    Please get once after your 65th birthday    Hepatitis B for Moderate/High Risk       No orders found for this or any previous visit.  Medi self breast exams and annual mammograms. Patient be due for follow-up mammogram in June  - MAM SCREENING BILAT (ZCQ=25411); Future    3.  Essential hypertension  I reviewed goals for blood pressure as well as conservative management of hypertension includi

## 2021-03-04 NOTE — H&P
HPI:   Maritza Driscoll is a 76year old female Patient presents with:  Physical: Medicare AWV  no concerns, recent colonoscopy normal    Wt Readings from Last 6 Encounters:  03/04/21 : 130 lb 12.8 oz (59.3 kg)  10/13/20 : 129 lb (58.5 kg)  09/23/20 : 130 lb rash several days after             completing a course of medication twice     Past Medical History:   Diagnosis Date   • Elevated liver enzymes     resolved Sept, neg w/u including liver biopsy   • GERD (gastroesophageal reflux disease)    • History of b SYSTEMS:   GENERAL: generally feels well, no unusual fatigue,no excessive daytime drowsiness, good appetite, weight has been stable  SKIN:  No rashes or suspicious skin lesions  EYES:denies blurred vision or double vision, glasses/ contacts: +, last eye ex developed, well nourished,in no apparent distress  SKIN: no rashes,no suspicious lesions, 2 fleshy epidermal nevi on back, + scattered superficial telangiectasias across the upper back and shoulder  HEENT: Ears:  TMs intact, canals clear, hearing : AC>BC, Consults:  [facility] SCREENING BILAT (CPT=77067)  No follow-ups on file. Patient Instructions       Barb Moya's SCREENING SCHEDULE   Tests on this list are recommended by your physician but may not be covered, or covered at this frequency, by your insurer. Screening  Covered up to Age 76     Colonoscopy Screen   Covered every 10 years- more often if abnormal Colonoscopy due on 02/01/2031 Update Health Maintenance if applicable    Flex Sigmoidoscopy Screen  Covered every 5 years No results found for this or a PNEUMOCOCCAL VACC, 13 JOSÉ MANUEL IM    Please get once after your 65th birthday    Pneumococcal 23 (Pneumovax)  Covered Once after 65 Orders placed or performed in visit on 02/21/20   • PNEUMOCOCCAL IMM (PNEUMOVAX)    Please get once after your 65th birthday    H pharmacy on cost of Shingrix vaccine    2. Visit for screening mammogram  Continue monthly self breast exams and annual mammograms. Patient be due for follow-up mammogram in June  - MAM SCREENING BILAT (UDW=40644); Future    3.  Essential hypertension  I r

## 2021-03-05 ENCOUNTER — PATIENT MESSAGE (OUTPATIENT)
Dept: FAMILY MEDICINE CLINIC | Facility: CLINIC | Age: 66
End: 2021-03-05

## 2021-03-05 DIAGNOSIS — E78.00 HYPERCHOLESTEREMIA: Primary | ICD-10-CM

## 2021-03-05 DIAGNOSIS — Z51.81 MEDICATION MONITORING ENCOUNTER: Primary | ICD-10-CM

## 2021-03-05 RX ORDER — ROSUVASTATIN CALCIUM 10 MG/1
10 TABLET, COATED ORAL NIGHTLY
Qty: 30 TABLET | Refills: 0 | Status: SHIPPED | OUTPATIENT
Start: 2021-03-05 | End: 2021-03-30

## 2021-03-05 NOTE — TELEPHONE ENCOUNTER
From: Matthias Oviedo  To: Manohar Price DO  Sent: 3/5/2021 8:36 AM CST  Subject: Visit Follow-up Question    Dr Que Price,   Is there an alternative to the statin? Given my past history I worry about returning to statins.   I started the BP medicine thi

## 2021-03-12 ENCOUNTER — LAB ENCOUNTER (OUTPATIENT)
Dept: LAB | Age: 66
End: 2021-03-12
Attending: FAMILY MEDICINE
Payer: MEDICARE

## 2021-03-12 DIAGNOSIS — Z51.81 MEDICATION MONITORING ENCOUNTER: ICD-10-CM

## 2021-03-12 LAB
ALT SERPL-CCNC: 19 U/L
AST SERPL-CCNC: 19 U/L (ref 15–37)

## 2021-03-12 PROCEDURE — 84450 TRANSFERASE (AST) (SGOT): CPT

## 2021-03-12 PROCEDURE — 36415 COLL VENOUS BLD VENIPUNCTURE: CPT

## 2021-03-12 PROCEDURE — 84460 ALANINE AMINO (ALT) (SGPT): CPT

## 2021-03-30 DIAGNOSIS — E78.00 HYPERCHOLESTEREMIA: ICD-10-CM

## 2021-03-30 RX ORDER — AMLODIPINE BESYLATE 5 MG/1
5 TABLET ORAL DAILY
Qty: 30 TABLET | Refills: 1 | Status: CANCELLED | OUTPATIENT
Start: 2021-03-30

## 2021-03-30 RX ORDER — ROSUVASTATIN CALCIUM 10 MG/1
10 TABLET, COATED ORAL NIGHTLY
Qty: 30 TABLET | Refills: 0 | Status: SHIPPED | OUTPATIENT
Start: 2021-03-30 | End: 2021-04-26

## 2021-03-30 NOTE — TELEPHONE ENCOUNTER
Last OV: 3/4/21  Last labs: 3/12/21    No future appointments. Amlodipine denied: 1 refill left on script. -- Pt's aware

## 2021-04-13 ENCOUNTER — LAB ENCOUNTER (OUTPATIENT)
Dept: LAB | Age: 66
End: 2021-04-13
Attending: FAMILY MEDICINE
Payer: MEDICARE

## 2021-04-13 DIAGNOSIS — E78.00 HYPERCHOLESTEREMIA: ICD-10-CM

## 2021-04-13 PROCEDURE — 84460 ALANINE AMINO (ALT) (SGPT): CPT

## 2021-04-13 PROCEDURE — 80061 LIPID PANEL: CPT

## 2021-04-13 PROCEDURE — 36415 COLL VENOUS BLD VENIPUNCTURE: CPT

## 2021-04-26 DIAGNOSIS — E78.00 HYPERCHOLESTEREMIA: ICD-10-CM

## 2021-04-27 RX ORDER — ROSUVASTATIN CALCIUM 10 MG/1
10 TABLET, COATED ORAL NIGHTLY
Qty: 90 TABLET | Refills: 0 | Status: SHIPPED | OUTPATIENT
Start: 2021-04-27 | End: 2021-07-28

## 2021-04-27 RX ORDER — AMLODIPINE BESYLATE 5 MG/1
5 TABLET ORAL DAILY
Qty: 90 TABLET | Refills: 0 | Status: SHIPPED | OUTPATIENT
Start: 2021-04-27 | End: 2021-07-28 | Stop reason: DRUGHIGH

## 2021-04-27 NOTE — TELEPHONE ENCOUNTER
Last OV: 3/4/21  Last labs: 4/13/21    No future appointments.       Cholesterol Medication Protocol [id number]/26/2021 05:28 PM   ALT < 80 Protocol Details    ALT resulted within past year     Lipid panel within past 12 months     Appointment within past 12

## 2021-06-03 ENCOUNTER — TELEPHONE (OUTPATIENT)
Dept: FAMILY MEDICINE CLINIC | Facility: CLINIC | Age: 66
End: 2021-06-03

## 2021-07-28 ENCOUNTER — OFFICE VISIT (OUTPATIENT)
Dept: FAMILY MEDICINE CLINIC | Facility: CLINIC | Age: 66
End: 2021-07-28
Payer: MEDICARE

## 2021-07-28 VITALS
SYSTOLIC BLOOD PRESSURE: 142 MMHG | BODY MASS INDEX: 19.39 KG/M2 | WEIGHT: 125 LBS | HEIGHT: 67.5 IN | HEART RATE: 81 BPM | TEMPERATURE: 99 F | DIASTOLIC BLOOD PRESSURE: 88 MMHG | OXYGEN SATURATION: 98 %

## 2021-07-28 DIAGNOSIS — E78.00 HYPERCHOLESTEREMIA: ICD-10-CM

## 2021-07-28 DIAGNOSIS — I10 ESSENTIAL HYPERTENSION: Primary | ICD-10-CM

## 2021-07-28 DIAGNOSIS — I25.10 ASHD (ARTERIOSCLEROTIC HEART DISEASE): ICD-10-CM

## 2021-07-28 DIAGNOSIS — Z51.81 MEDICATION MONITORING ENCOUNTER: ICD-10-CM

## 2021-07-28 DIAGNOSIS — R42 ORTHOSTATIC LIGHTHEADEDNESS: ICD-10-CM

## 2021-07-28 PROCEDURE — 99213 OFFICE O/P EST LOW 20 MIN: CPT | Performed by: FAMILY MEDICINE

## 2021-07-28 RX ORDER — ROSUVASTATIN CALCIUM 10 MG/1
10 TABLET, COATED ORAL NIGHTLY
Qty: 90 TABLET | Refills: 0 | Status: SHIPPED | OUTPATIENT
Start: 2021-07-28 | End: 2021-10-22

## 2021-07-28 RX ORDER — AMLODIPINE BESYLATE 2.5 MG/1
2.5 TABLET ORAL DAILY
Qty: 90 TABLET | Refills: 1 | Status: SHIPPED | OUTPATIENT
Start: 2021-07-28 | End: 2021-08-21

## 2021-07-28 NOTE — PATIENT INSTRUCTIONS
I advised patient that I would like her to check her blood pressure both supine and standing  Discussed importance of adequate fluid intake throughout the course of the day to maintain adequate intravascular volume  We will decrease amlodipine to 2.5 mg da

## 2021-07-28 NOTE — PROGRESS NOTES
Jose Angel Layne is a 78year old female. Patient presents with:  HTN  Lipids  Ashd  Medication Follow-Up    HPI:   Yoga calss 2 x weekly, walks at [facility] several times per week, exercise class 2 x weekly  Current Outpatient Medications   Medication Si laying, patient has to sit on the side of the bed for several minutes before getting up  GI: denies abdominal pain and denies heartburn  NEURO: denies headaches  PSYCH: denies feeling stressed or anxious    EXAM:   /86   Pulse 81   Temp 98.8 °F (37.1 understanding of these issues and agrees to the plan. Rl Carbone.  Melo Villegas, FAAFP

## 2021-08-19 ENCOUNTER — NURSE ONLY (OUTPATIENT)
Dept: FAMILY MEDICINE CLINIC | Facility: CLINIC | Age: 66
End: 2021-08-19
Payer: MEDICARE

## 2021-08-19 VITALS — DIASTOLIC BLOOD PRESSURE: 82 MMHG | SYSTOLIC BLOOD PRESSURE: 164 MMHG

## 2021-08-19 DIAGNOSIS — I10 ESSENTIAL HYPERTENSION: Primary | ICD-10-CM

## 2021-08-19 NOTE — PROGRESS NOTES
Pt here for nurse appt.   She brought in her home BP monitor to compare to ours, ALONG WITH SEVERAL HOME BP READINGS (on desk for review)    BP with ours:  164/82  BP with her machine:  165/93

## 2021-08-20 ENCOUNTER — TELEPHONE (OUTPATIENT)
Dept: FAMILY MEDICINE CLINIC | Facility: CLINIC | Age: 66
End: 2021-08-20

## 2021-08-20 NOTE — TELEPHONE ENCOUNTER
Please advise patient that I reviewed her blood pressure readings as well as the comparison of her monitor to to our manual checking. It appears they are fairly comparable and perhaps hers runs a little higher than ours.  If the lightheadedness has reso

## 2021-08-20 NOTE — PROGRESS NOTES
Please advise patient that I reviewed her blood pressure readings as well as the comparison of her monitor to to our manual checking. It appears they are fairly comparable and perhaps hers runs a little higher than ours.  If the lightheadedness has resolved

## 2021-10-22 ENCOUNTER — OFFICE VISIT (OUTPATIENT)
Dept: FAMILY MEDICINE CLINIC | Facility: CLINIC | Age: 66
End: 2021-10-22
Payer: MEDICARE

## 2021-10-22 ENCOUNTER — TELEPHONE (OUTPATIENT)
Dept: FAMILY MEDICINE CLINIC | Facility: CLINIC | Age: 66
End: 2021-10-22

## 2021-10-22 ENCOUNTER — LAB ENCOUNTER (OUTPATIENT)
Dept: LAB | Age: 66
End: 2021-10-22
Attending: FAMILY MEDICINE
Payer: MEDICARE

## 2021-10-22 VITALS
WEIGHT: 131 LBS | TEMPERATURE: 98 F | HEART RATE: 92 BPM | DIASTOLIC BLOOD PRESSURE: 89 MMHG | HEIGHT: 67 IN | SYSTOLIC BLOOD PRESSURE: 138 MMHG | RESPIRATION RATE: 18 BRPM | BODY MASS INDEX: 20.56 KG/M2

## 2021-10-22 DIAGNOSIS — Z51.81 MEDICATION MONITORING ENCOUNTER: ICD-10-CM

## 2021-10-22 DIAGNOSIS — I10 ESSENTIAL HYPERTENSION: ICD-10-CM

## 2021-10-22 DIAGNOSIS — F43.21 FEELING GRIEF: ICD-10-CM

## 2021-10-22 DIAGNOSIS — L98.9 LESION OF THUMB: Primary | ICD-10-CM

## 2021-10-22 DIAGNOSIS — E78.00 HYPERCHOLESTEREMIA: ICD-10-CM

## 2021-10-22 DIAGNOSIS — Z23 NEED FOR VACCINATION: ICD-10-CM

## 2021-10-22 PROCEDURE — 99214 OFFICE O/P EST MOD 30 MIN: CPT | Performed by: FAMILY MEDICINE

## 2021-10-22 PROCEDURE — 90636 HEP A/HEP B VACC ADULT IM: CPT | Performed by: FAMILY MEDICINE

## 2021-10-22 PROCEDURE — 80061 LIPID PANEL: CPT

## 2021-10-22 PROCEDURE — 90471 IMMUNIZATION ADMIN: CPT | Performed by: FAMILY MEDICINE

## 2021-10-22 PROCEDURE — 80053 COMPREHEN METABOLIC PANEL: CPT

## 2021-10-22 PROCEDURE — 36415 COLL VENOUS BLD VENIPUNCTURE: CPT

## 2021-10-22 RX ORDER — MELOXICAM 15 MG/1
15 TABLET ORAL DAILY
Qty: 30 TABLET | Refills: 0 | Status: SHIPPED | OUTPATIENT
Start: 2021-10-22 | End: 2021-10-22

## 2021-10-22 RX ORDER — MELOXICAM 15 MG/1
15 TABLET ORAL DAILY
Qty: 90 TABLET | Refills: 0 | Status: SHIPPED | OUTPATIENT
Start: 2021-10-22 | End: 2022-01-20

## 2021-10-22 RX ORDER — ROSUVASTATIN CALCIUM 10 MG/1
10 TABLET, COATED ORAL NIGHTLY
Qty: 90 TABLET | Refills: 0 | Status: SHIPPED | OUTPATIENT
Start: 2021-10-22 | End: 2022-01-10

## 2021-10-22 NOTE — PROGRESS NOTES
Pricilla Greenberg is a 78year old female.  Patient presents with:  Lesion: left thumb       HPI:   X 6 months pt notices a lesion left thumb , worse w/ stress, itches, pt admits to picking at it when stressed  Father-in-law , lives in [place], came to visit wheezing  CARDIOVASCULAR: denies chest pain on exertion, edema  GI: denies abdominal pain and denies heartburn  NEURO: denies headaches  PSYCH: see hpi, good support system    EXAM:   /89 (BP Location: Right arm, Patient Position: Sitting, Cuff Size: that I suspect her thumb lesion is from chronic picking due to stress. May try topical 1% hydrocortisone 1-2 times daily for itching as needed  Discussed immunizations. Would recommend hepatitis a and B vaccinations prior to travel to [place].   Twinrix gi

## 2021-12-09 ENCOUNTER — TELEPHONE (OUTPATIENT)
Dept: FAMILY MEDICINE CLINIC | Facility: CLINIC | Age: 66
End: 2021-12-09

## 2021-12-09 ENCOUNTER — NURSE ONLY (OUTPATIENT)
Dept: FAMILY MEDICINE CLINIC | Facility: CLINIC | Age: 66
End: 2021-12-09
Payer: MEDICARE

## 2021-12-09 DIAGNOSIS — Z23 NEED FOR HEPATITIS A AND B VACCINATION: Primary | ICD-10-CM

## 2021-12-09 DIAGNOSIS — Z23 NEED FOR VACCINATION: ICD-10-CM

## 2021-12-09 PROCEDURE — 90471 IMMUNIZATION ADMIN: CPT | Performed by: FAMILY MEDICINE

## 2021-12-09 PROCEDURE — 90636 HEP A/HEP B VACC ADULT IM: CPT | Performed by: FAMILY MEDICINE

## 2021-12-09 NOTE — PROGRESS NOTES
Pt here for Twinrix #2---given. Pt tolerated well. Pt brought in documentation of 2 other vaccines given @ the travel clinic:  IPV and Typhoid.   Immunizations documented in Epic

## 2021-12-20 ENCOUNTER — OFFICE VISIT (OUTPATIENT)
Dept: FAMILY MEDICINE CLINIC | Facility: CLINIC | Age: 66
End: 2021-12-20
Payer: MEDICARE

## 2021-12-20 VITALS
RESPIRATION RATE: 16 BRPM | DIASTOLIC BLOOD PRESSURE: 76 MMHG | TEMPERATURE: 98 F | SYSTOLIC BLOOD PRESSURE: 128 MMHG | OXYGEN SATURATION: 98 % | WEIGHT: 134 LBS | HEIGHT: 67 IN | BODY MASS INDEX: 21.03 KG/M2 | HEART RATE: 57 BPM

## 2021-12-20 DIAGNOSIS — H81.12 BENIGN PAROXYSMAL POSITIONAL VERTIGO OF LEFT EAR: Primary | ICD-10-CM

## 2021-12-20 PROCEDURE — 99214 OFFICE O/P EST MOD 30 MIN: CPT | Performed by: INTERNAL MEDICINE

## 2021-12-20 RX ORDER — ATOVAQUONE AND PROGUANIL HYDROCHLORIDE 250; 100 MG/1; MG/1
TABLET, FILM COATED ORAL
COMMUNITY
Start: 2021-12-08

## 2021-12-20 NOTE — PROGRESS NOTES
Marita Rowe is a 78year old female. HPI:   Vertigo with head changes,  Lasts seconds no vomiting. Current Outpatient Medications   Medication Sig Dispense Refill   • rosuvastatin 10 MG Oral Tab Take 1 tablet (10 mg total) by mouth nightly.  90 tablet nystagmius to the left  NECK: supple,no adenopathy,no bruits  LUNGS: clear to auscultation  CARDIO: RRR without murmur  GI: good BS's,no masses, HSM or tenderness  EXTREMITIES: no cyanosis, clubbing or edema    ASSESSMENT AND PLAN:     Benign paroxysmal po

## 2022-01-10 DIAGNOSIS — E78.00 HYPERCHOLESTEREMIA: ICD-10-CM

## 2022-01-10 RX ORDER — ROSUVASTATIN CALCIUM 10 MG/1
10 TABLET, COATED ORAL NIGHTLY
Qty: 90 TABLET | Refills: 0 | Status: SHIPPED | OUTPATIENT
Start: 2022-01-10

## 2022-01-10 NOTE — TELEPHONE ENCOUNTER
Last refill: 10/22/21  Qty: 90  W/ 0 refills  Last ov: 10/22/21    Requested Prescriptions     Pending Prescriptions Disp Refills   • rosuvastatin 10 MG Oral Tab 90 tablet 0     Sig: Take 1 tablet (10 mg total) by mouth nightly. No future appointments.

## 2022-03-08 ENCOUNTER — OFFICE VISIT (OUTPATIENT)
Dept: FAMILY MEDICINE CLINIC | Facility: CLINIC | Age: 67
End: 2022-03-08
Payer: MEDICARE

## 2022-03-08 VITALS
OXYGEN SATURATION: 98 % | SYSTOLIC BLOOD PRESSURE: 122 MMHG | TEMPERATURE: 98 F | HEIGHT: 67 IN | WEIGHT: 131 LBS | RESPIRATION RATE: 14 BRPM | DIASTOLIC BLOOD PRESSURE: 80 MMHG | HEART RATE: 60 BPM | BODY MASS INDEX: 20.56 KG/M2

## 2022-03-08 DIAGNOSIS — L98.9 LEG SKIN LESION, LEFT: ICD-10-CM

## 2022-03-08 DIAGNOSIS — K58.2 IRRITABLE BOWEL SYNDROME WITH BOTH CONSTIPATION AND DIARRHEA: ICD-10-CM

## 2022-03-08 DIAGNOSIS — R42 EPISODIC LIGHTHEADEDNESS: ICD-10-CM

## 2022-03-08 DIAGNOSIS — I25.10 ASHD (ARTERIOSCLEROTIC HEART DISEASE): ICD-10-CM

## 2022-03-08 DIAGNOSIS — F43.20 ADULT SITUATIONAL STRESS DISORDER: ICD-10-CM

## 2022-03-08 DIAGNOSIS — E78.00 HYPERCHOLESTEREMIA: ICD-10-CM

## 2022-03-08 DIAGNOSIS — Z12.31 VISIT FOR SCREENING MAMMOGRAM: ICD-10-CM

## 2022-03-08 DIAGNOSIS — Z80.0 FAMILY HISTORY OF COLON CANCER IN FATHER: ICD-10-CM

## 2022-03-08 DIAGNOSIS — H33.22 LEFT RETINAL DETACHMENT: ICD-10-CM

## 2022-03-08 DIAGNOSIS — Z00.00 ENCOUNTER FOR ANNUAL HEALTH EXAMINATION: Primary | ICD-10-CM

## 2022-03-08 DIAGNOSIS — M85.852 OSTEOPENIA OF LEFT HIP: ICD-10-CM

## 2022-03-08 PROCEDURE — 99214 OFFICE O/P EST MOD 30 MIN: CPT | Performed by: FAMILY MEDICINE

## 2022-03-08 PROCEDURE — 17000 DESTRUCT PREMALG LESION: CPT | Performed by: FAMILY MEDICINE

## 2022-03-08 PROCEDURE — G0439 PPPS, SUBSEQ VISIT: HCPCS | Performed by: FAMILY MEDICINE

## 2022-03-08 RX ORDER — CLOBETASOL PROPIONATE 0.5 MG/G
1 CREAM TOPICAL NIGHTLY PRN
Qty: 15 G | Refills: 0 | Status: SHIPPED | OUTPATIENT
Start: 2022-03-08

## 2022-04-05 ENCOUNTER — HOSPITAL ENCOUNTER (OUTPATIENT)
Dept: BONE DENSITY | Age: 67
Discharge: HOME | End: 2022-04-05
Attending: FAMILY MEDICINE
Payer: MEDICARE

## 2022-04-05 DIAGNOSIS — M85.852 OSTEOPENIA OF LEFT HIP: ICD-10-CM

## 2022-04-05 PROCEDURE — 77080 DXA BONE DENSITY AXIAL: CPT | Performed by: FAMILY MEDICINE

## 2022-04-25 ENCOUNTER — PATIENT MESSAGE (OUTPATIENT)
Dept: FAMILY MEDICINE CLINIC | Facility: CLINIC | Age: 67
End: 2022-04-25

## 2022-04-25 NOTE — TELEPHONE ENCOUNTER
From: Sowmya Arceo  To: Alfonzo Leiva. Sukhdev Bang DO  Sent: 4/25/2022 9:54 AM CDT  Subject: Covid Vaccine, etc    I got another Gracie Arias booster 4/22/22. Mammogram scheduled for June 1, 2022.   Milo Juan

## 2022-04-26 ENCOUNTER — LAB ENCOUNTER (OUTPATIENT)
Dept: LAB | Age: 67
End: 2022-04-26
Attending: FAMILY MEDICINE
Payer: MEDICARE

## 2022-04-26 DIAGNOSIS — I10 ESSENTIAL HYPERTENSION: ICD-10-CM

## 2022-04-26 DIAGNOSIS — E78.00 HYPERCHOLESTEREMIA: ICD-10-CM

## 2022-04-26 LAB
ALBUMIN SERPL-MCNC: 3.9 G/DL (ref 3.4–5)
ALBUMIN/GLOB SERPL: 1.1 {RATIO} (ref 1–2)
ALP LIVER SERPL-CCNC: 77 U/L
ALT SERPL-CCNC: 20 U/L
ANION GAP SERPL CALC-SCNC: 5 MMOL/L (ref 0–18)
AST SERPL-CCNC: 13 U/L (ref 15–37)
BILIRUB SERPL-MCNC: 0.6 MG/DL (ref 0.1–2)
BUN BLD-MCNC: 10 MG/DL (ref 7–18)
CALCIUM BLD-MCNC: 9.4 MG/DL (ref 8.5–10.1)
CHLORIDE SERPL-SCNC: 106 MMOL/L (ref 98–112)
CHOLEST SERPL-MCNC: 191 MG/DL (ref ?–200)
CO2 SERPL-SCNC: 29 MMOL/L (ref 21–32)
CREAT BLD-MCNC: 0.9 MG/DL
FASTING PATIENT LIPID ANSWER: YES
FASTING STATUS PATIENT QL REPORTED: YES
GLOBULIN PLAS-MCNC: 3.5 G/DL (ref 2.8–4.4)
GLUCOSE BLD-MCNC: 100 MG/DL (ref 70–99)
HDLC SERPL-MCNC: 74 MG/DL (ref 40–59)
LDLC SERPL CALC-MCNC: 100 MG/DL (ref ?–100)
NONHDLC SERPL-MCNC: 117 MG/DL (ref ?–130)
OSMOLALITY SERPL CALC.SUM OF ELEC: 289 MOSM/KG (ref 275–295)
POTASSIUM SERPL-SCNC: 5.6 MMOL/L (ref 3.5–5.1)
PROT SERPL-MCNC: 7.4 G/DL (ref 6.4–8.2)
SODIUM SERPL-SCNC: 140 MMOL/L (ref 136–145)
TRIGL SERPL-MCNC: 97 MG/DL (ref 30–149)
VLDLC SERPL CALC-MCNC: 16 MG/DL (ref 0–30)

## 2022-04-26 PROCEDURE — 80061 LIPID PANEL: CPT

## 2022-04-26 PROCEDURE — 36415 COLL VENOUS BLD VENIPUNCTURE: CPT

## 2022-04-26 PROCEDURE — 80053 COMPREHEN METABOLIC PANEL: CPT

## 2022-04-27 RX ORDER — ROSUVASTATIN CALCIUM 10 MG/1
10 TABLET, COATED ORAL NIGHTLY
Qty: 90 TABLET | Refills: 1 | Status: SHIPPED | OUTPATIENT
Start: 2022-04-27

## 2022-05-10 ENCOUNTER — LAB ENCOUNTER (OUTPATIENT)
Dept: LAB | Age: 67
End: 2022-05-10
Attending: FAMILY MEDICINE
Payer: MEDICARE

## 2022-05-10 DIAGNOSIS — E87.5 SERUM POTASSIUM ELEVATED: ICD-10-CM

## 2022-05-10 LAB — POTASSIUM SERPL-SCNC: 4.8 MMOL/L (ref 3.5–5.1)

## 2022-05-10 PROCEDURE — 84132 ASSAY OF SERUM POTASSIUM: CPT

## 2022-05-10 PROCEDURE — 36415 COLL VENOUS BLD VENIPUNCTURE: CPT

## 2022-06-02 ENCOUNTER — TELEPHONE (OUTPATIENT)
Dept: FAMILY MEDICINE CLINIC | Facility: CLINIC | Age: 67
End: 2022-06-02

## 2022-10-18 DIAGNOSIS — E78.00 HYPERCHOLESTEREMIA: ICD-10-CM

## 2022-10-18 RX ORDER — ROSUVASTATIN CALCIUM 10 MG/1
TABLET, COATED ORAL
Qty: 90 TABLET | Refills: 1 | Status: SHIPPED | OUTPATIENT
Start: 2022-10-18

## 2022-10-18 NOTE — TELEPHONE ENCOUNTER
Last refill: 4/27/22 #90 w/ 1 refills    Last OV: 3/8/22  Last labs: 4/26/22 (Lipid due 10/27/22)    No future appointments.          Cholesterol Medication Protocol Passed 10/18/2022 09:41 AM   Protocol Details  ALT < 80    ALT resulted within past year    Lipid panel within past 12 months    Appointment within past 12 or next 3 months

## 2022-10-27 ENCOUNTER — LABORATORY ENCOUNTER (OUTPATIENT)
Dept: LAB | Age: 67
End: 2022-10-27
Attending: FAMILY MEDICINE
Payer: MEDICARE

## 2022-10-27 DIAGNOSIS — E78.00 HYPERCHOLESTEREMIA: ICD-10-CM

## 2022-10-27 LAB
CHOLEST SERPL-MCNC: 195 MG/DL (ref ?–200)
FASTING PATIENT LIPID ANSWER: YES
HDLC SERPL-MCNC: 74 MG/DL (ref 40–59)
LDLC SERPL CALC-MCNC: 102 MG/DL (ref ?–100)
NONHDLC SERPL-MCNC: 121 MG/DL (ref ?–130)
TRIGL SERPL-MCNC: 106 MG/DL (ref 30–149)
VLDLC SERPL CALC-MCNC: 18 MG/DL (ref 0–30)

## 2022-10-27 PROCEDURE — 36415 COLL VENOUS BLD VENIPUNCTURE: CPT

## 2022-10-27 PROCEDURE — 80061 LIPID PANEL: CPT

## 2022-10-28 DIAGNOSIS — E78.00 HYPERCHOLESTEREMIA: Primary | ICD-10-CM

## 2022-12-20 ENCOUNTER — OFFICE VISIT (OUTPATIENT)
Dept: FAMILY MEDICINE CLINIC | Facility: CLINIC | Age: 67
End: 2022-12-20
Payer: MEDICARE

## 2022-12-20 VITALS
WEIGHT: 132 LBS | TEMPERATURE: 98 F | HEART RATE: 85 BPM | SYSTOLIC BLOOD PRESSURE: 122 MMHG | OXYGEN SATURATION: 98 % | RESPIRATION RATE: 18 BRPM | BODY MASS INDEX: 20.72 KG/M2 | HEIGHT: 67 IN | DIASTOLIC BLOOD PRESSURE: 82 MMHG

## 2022-12-20 DIAGNOSIS — L30.8 OTHER ECZEMA: Primary | ICD-10-CM

## 2022-12-20 PROCEDURE — 99213 OFFICE O/P EST LOW 20 MIN: CPT | Performed by: FAMILY MEDICINE

## 2022-12-20 RX ORDER — TRIAMCINOLONE ACETONIDE 1 MG/G
CREAM TOPICAL 2 TIMES DAILY
Qty: 15 G | Refills: 0 | Status: SHIPPED | OUTPATIENT
Start: 2022-12-20

## 2022-12-20 NOTE — PATIENT INSTRUCTIONS
Discussed diagnosis and contributing factors.   Recommend triamcinolone 0.1% cream thinly twice daily until inflammation resolves  Discussed importance of frequent application of moisturizer, especially after showering

## 2023-03-08 ENCOUNTER — OFFICE VISIT (OUTPATIENT)
Dept: FAMILY MEDICINE CLINIC | Facility: CLINIC | Age: 68
End: 2023-03-08
Payer: MEDICARE

## 2023-03-08 ENCOUNTER — PATIENT MESSAGE (OUTPATIENT)
Dept: FAMILY MEDICINE CLINIC | Facility: CLINIC | Age: 68
End: 2023-03-08

## 2023-03-08 VITALS
DIASTOLIC BLOOD PRESSURE: 80 MMHG | OXYGEN SATURATION: 98 % | BODY MASS INDEX: 21.03 KG/M2 | HEART RATE: 80 BPM | WEIGHT: 134 LBS | TEMPERATURE: 98 F | HEIGHT: 67 IN | SYSTOLIC BLOOD PRESSURE: 132 MMHG | RESPIRATION RATE: 16 BRPM

## 2023-03-08 DIAGNOSIS — E78.00 HYPERCHOLESTEREMIA: ICD-10-CM

## 2023-03-08 DIAGNOSIS — Z00.00 ENCOUNTER FOR ANNUAL HEALTH EXAMINATION: Primary | ICD-10-CM

## 2023-03-08 DIAGNOSIS — I25.10 ASHD (ARTERIOSCLEROTIC HEART DISEASE): ICD-10-CM

## 2023-03-08 DIAGNOSIS — M85.852 OSTEOPENIA OF LEFT HIP: ICD-10-CM

## 2023-03-08 DIAGNOSIS — Z12.31 VISIT FOR SCREENING MAMMOGRAM: ICD-10-CM

## 2023-03-08 DIAGNOSIS — R00.2 PALPITATIONS: ICD-10-CM

## 2023-03-08 DIAGNOSIS — K58.2 IRRITABLE BOWEL SYNDROME WITH BOTH CONSTIPATION AND DIARRHEA: ICD-10-CM

## 2023-03-08 DIAGNOSIS — Z80.0 FAMILY HISTORY OF COLON CANCER IN FATHER: ICD-10-CM

## 2023-03-08 DIAGNOSIS — H33.22 LEFT RETINAL DETACHMENT: ICD-10-CM

## 2023-03-08 PROCEDURE — 99213 OFFICE O/P EST LOW 20 MIN: CPT | Performed by: FAMILY MEDICINE

## 2023-03-08 PROCEDURE — 1126F AMNT PAIN NOTED NONE PRSNT: CPT | Performed by: FAMILY MEDICINE

## 2023-03-08 PROCEDURE — G0439 PPPS, SUBSEQ VISIT: HCPCS | Performed by: FAMILY MEDICINE

## 2023-03-08 RX ORDER — ESTRADIOL 0.1 MG/G
0.5 CREAM VAGINAL
Qty: 1 EACH | Refills: 0 | Status: SHIPPED
Start: 2023-03-09

## 2023-03-08 NOTE — TELEPHONE ENCOUNTER
From: Roland Landeros  To: Adriane Morfin. Misti Veronica DO  Sent: 3/8/2023 3:39 PM CST  Subject: DEXA    Dr. Misti Veronica,  I scheduled a DEXA test, but after looking at past visits I noticed that I had just had a DEXA scan on April 5, 2022. Should I go ahead with one this year, or cancel and wait another year?   Mario Weiner

## 2023-03-08 NOTE — PATIENT INSTRUCTIONS
Irma Moya's SCREENING SCHEDULE   Tests on this list are recommended by your physician but may not be covered, or covered at this frequency, by your insurer. Please check with your insurance carrier before scheduling to verify coverage. PREVENTATIVE SERVICES FREQUENCY &  COVERAGE DETAILS LAST COMPLETION DATE   Diabetes Screening    Fasting Blood Sugar /  Glucose    One screening every 12 months if never tested or if previously tested but not diagnosed with pre-diabetes   One screening every 6 months if diagnosed with pre-diabetes Lab Results   Component Value Date     (H) 04/26/2022        Cardiovascular Disease Screening    Lipid Panel  Cholesterol  Lipoprotein (HDL)  Triglycerides Covered every 5 years for all Medicare beneficiaries without apparent signs or symptoms of cardiovascular disease Lab Results   Component Value Date    CHOLEST 195 10/27/2022    HDL 74 (H) 10/27/2022     (H) 10/27/2022    TRIG 106 10/27/2022         Electrocardiogram (EKG)   Covered if needed at Welcome to Medicare, and non-screening if indicated for medical reasons 02/16/2018      Ultrasound Screening for Abdominal Aortic Aneurysm (AAA) Covered once in a lifetime for one of the following risk factors    Men who are 73-68 years old and have ever smoked    Anyone with a family history -     Colorectal Cancer Screening  Covered for ages 52-80; only need ONE of the following:    Colonoscopy   Covered every 10 years    Covered every 2 years if patient is at high risk or previous colonoscopy was abnormal 02/01/2021    Colorectal Cancer Screening due on 02/01/2031    Flexible Sigmoidoscopy   Covered every 4 years -    Fecal Occult Blood Test Covered annually -   Bone Density Screening    Bone density screening    Covered every 2 years after age 72 if diagnosed with risk of osteoporosis or estrogen deficiency.     Covered yearly for long-term glucocorticoid medication use (Steroids) Last Dexa Scan:    XR DEXA BONE DENSITOMETRY (CPT=77080) 04/05/2022      No recommendations at this time   Pap and Pelvic    Pap   Covered every 2 years for women at normal risk; Annually if at high risk -  No recommendations at this time    Chlamydia Annually if high risk -  No recommendations at this time   Screening Mammogram    Mammogram     Recommend annually for all female patients aged 36 and older    One baseline mammogram covered for patients aged 32-38 06/01/2022    Mammogram due on 06/01/2023    Immunizations    Influenza Covered once per flu season  Please get every year 11/06/2022  No recommendations at this time    Pneumococcal Each vaccine ([id number] & [id number]) covered once after 65 Prevnar 13: 02/14/2018    Dcsrdaedt20: 02/21/2020     No recommendations at this time    Hepatitis B One screening covered for patients with certain risk factors   -  No recommendations at this time    Tetanus Toxoid Not covered by Medicare Part B unless medically necessary (cut with metal); may be covered with your pharmacy prescription benefits -    Tetanus, Diptheria and Pertusis TD and TDaP Not covered by Medicare Part B -  No recommendations at this time    Zoster Not covered by Medicare Part B; may be covered with your pharmacy  prescription benefits 01/26/2015  Zoster Vaccines(2 of 3) due on 03/23/2015      1. Encounter for annual health examination  I reviewed age-appropriate preventive health screen exams as well as advanced directives and immunizations. Patient referred to local pharmacy for Shingrix vaccine    2. Visit for screening mammogram  Continue monthly self breast exams and annual mammograms. Patient minded she is due for follow-up mammogram in June  - [facility] SCREENING BILAT (TCR=98699); Future    3. Hypercholesteremia-  Reviewed goals for lipids. Continue statin therapy, recheck labs in October    4.  ASHD (arteriosclerotic heart disease)-calcium score 55 on heart scan February 2020  Monitor clinically, continue statin therapy    5. Family history of colon cancer in father- @ age [de-identified], brother @ 58  Repeat colonoscopy 2026    6. Osteopenia of left hip  Repeat bone density, continue daily weightbearing activity and vitamin D supplement  - XR DEXA BONE DENSITOMETRY (CPT=77080); Future    7. Irritable bowel syndrome with both constipation and diarrhea  Continue fiber supplement, monitor clinically    8. Left retinal detachment  Continue annual eye exams, monitor clinically    9. Palpitations  Discussed likely diagnosis and contributing factors. Strongly suspect PVCs.   If symptoms last longer or more frequent, will check Holter monitor

## 2023-04-12 ENCOUNTER — TELEPHONE (OUTPATIENT)
Dept: FAMILY MEDICINE CLINIC | Facility: CLINIC | Age: 68
End: 2023-04-12

## 2023-04-12 DIAGNOSIS — E78.00 HYPERCHOLESTEREMIA: ICD-10-CM

## 2023-04-12 RX ORDER — ROSUVASTATIN CALCIUM 10 MG/1
10 TABLET, COATED ORAL NIGHTLY
Qty: 90 TABLET | Refills: 1 | Status: SHIPPED | OUTPATIENT
Start: 2023-04-12

## 2023-06-01 ENCOUNTER — MED REC SCAN ONLY (OUTPATIENT)
Dept: FAMILY MEDICINE CLINIC | Facility: CLINIC | Age: 68
End: 2023-06-01

## 2023-06-01 ENCOUNTER — TELEPHONE (OUTPATIENT)
Dept: FAMILY MEDICINE CLINIC | Facility: CLINIC | Age: 68
End: 2023-06-01

## 2023-06-14 ENCOUNTER — HOSPITAL ENCOUNTER (OUTPATIENT)
Dept: CT IMAGING | Age: 68
Discharge: HOME | End: 2023-06-14
Attending: FAMILY MEDICINE

## 2023-06-14 VITALS — WEIGHT: 130 LBS | HEIGHT: 68 IN | BODY MASS INDEX: 19.7 KG/M2

## 2023-06-14 DIAGNOSIS — R91.1 LUNG NODULE, SOLITARY: Primary | ICD-10-CM

## 2023-06-14 DIAGNOSIS — Z12.31 VISIT FOR SCREENING MAMMOGRAM: ICD-10-CM

## 2023-06-14 DIAGNOSIS — Z13.9 SPECIAL SCREENING: ICD-10-CM

## 2023-06-14 LAB
GLUCOSE POC: 96 MG/DL (ref 70–100)
HDL POC: 74 MG/DL (ref 40–55)
LDL POC: 92 MG/DL (ref 0–100)
TC/HDL RATIO: 3 (ref 0–5)
TOTAL CHOLESTEROL POC: 187 MG/DL (ref 0–200)
TRIGLYCERIDES POC: 107 MG/DL (ref 0–150)

## 2023-06-15 DIAGNOSIS — R91.1 LUNG NODULE: Primary | ICD-10-CM

## 2023-06-19 ENCOUNTER — HOSPITAL ENCOUNTER (OUTPATIENT)
Dept: CT IMAGING | Age: 68
Discharge: HOME | End: 2023-06-19
Attending: FAMILY MEDICINE
Payer: MEDICARE

## 2023-06-19 DIAGNOSIS — R91.1 LUNG NODULE, SOLITARY: ICD-10-CM

## 2023-06-19 PROCEDURE — 71250 CT THORAX DX C-: CPT | Performed by: FAMILY MEDICINE

## 2023-06-22 DIAGNOSIS — E78.00 HYPERCHOLESTEREMIA: Primary | ICD-10-CM

## 2023-06-22 RX ORDER — ROSUVASTATIN CALCIUM 20 MG/1
20 TABLET, COATED ORAL NIGHTLY
Qty: 1 TABLET | Refills: 0 | COMMUNITY
Start: 2023-06-22

## 2023-07-05 RX ORDER — ROSUVASTATIN CALCIUM 20 MG/1
20 TABLET, COATED ORAL NIGHTLY
Qty: 90 TABLET | Refills: 3 | Status: SHIPPED | OUTPATIENT
Start: 2023-07-05

## 2023-08-30 ENCOUNTER — OFFICE VISIT (OUTPATIENT)
Dept: FAMILY MEDICINE CLINIC | Facility: CLINIC | Age: 68
End: 2023-08-30
Payer: MEDICARE

## 2023-08-30 VITALS
OXYGEN SATURATION: 97 % | WEIGHT: 131 LBS | BODY MASS INDEX: 19.85 KG/M2 | HEIGHT: 68 IN | SYSTOLIC BLOOD PRESSURE: 120 MMHG | RESPIRATION RATE: 16 BRPM | HEART RATE: 76 BPM | TEMPERATURE: 98 F | DIASTOLIC BLOOD PRESSURE: 80 MMHG

## 2023-08-30 DIAGNOSIS — M54.50 CHRONIC RIGHT-SIDED LOW BACK PAIN WITHOUT SCIATICA: ICD-10-CM

## 2023-08-30 DIAGNOSIS — H69.93 DYSFUNCTION OF BOTH EUSTACHIAN TUBES: Primary | ICD-10-CM

## 2023-08-30 DIAGNOSIS — G89.29 CHRONIC RIGHT-SIDED LOW BACK PAIN WITHOUT SCIATICA: ICD-10-CM

## 2023-08-30 DIAGNOSIS — R25.2 CALF CRAMP: ICD-10-CM

## 2023-08-30 PROCEDURE — 99214 OFFICE O/P EST MOD 30 MIN: CPT | Performed by: FAMILY MEDICINE

## 2023-10-25 ENCOUNTER — PATIENT MESSAGE (OUTPATIENT)
Dept: FAMILY MEDICINE CLINIC | Facility: CLINIC | Age: 68
End: 2023-10-25

## 2023-10-25 DIAGNOSIS — E78.00 HYPERCHOLESTEREMIA: Primary | ICD-10-CM

## 2023-10-25 DIAGNOSIS — Z79.899 ENCOUNTER FOR LONG-TERM (CURRENT) DRUG USE: ICD-10-CM

## 2023-10-25 NOTE — TELEPHONE ENCOUNTER
From: Sandip Larose  To: Roseann Eller. Jessie Jolley  Sent: 10/25/2023 6:56 AM CDT  Subject: Vaccinations     Dr. Jessie Jolley,  I got the COVID booster, RSV, and Flu shots on 10/15/2023. Did not feel well that evening (chills) or the next day (tired). I believe I reacted to the COVID shot because the same thing happened last year when it was just that shot. That was [employer], this was [employer]. Unrelated question: is Cologuard prescription? It is supposed to be easier than a colonoscopy, but not a replacement. Could it be used in between? My bowel movements are stringy and loose a lot. I use the fiber supplement once per day.   Reema Robert

## 2023-11-08 ENCOUNTER — LAB ENCOUNTER (OUTPATIENT)
Dept: LAB | Age: 68
End: 2023-11-08
Attending: FAMILY MEDICINE
Payer: MEDICARE

## 2023-11-08 DIAGNOSIS — Z79.899 ENCOUNTER FOR LONG-TERM (CURRENT) DRUG USE: ICD-10-CM

## 2023-11-08 DIAGNOSIS — E78.00 HYPERCHOLESTEREMIA: ICD-10-CM

## 2023-11-08 LAB
ALBUMIN SERPL-MCNC: 3.7 G/DL (ref 3.4–5)
ALBUMIN/GLOB SERPL: 1 {RATIO} (ref 1–2)
ALP LIVER SERPL-CCNC: 78 U/L
ALT SERPL-CCNC: 17 U/L
ANION GAP SERPL CALC-SCNC: 4 MMOL/L (ref 0–18)
AST SERPL-CCNC: 15 U/L (ref 15–37)
BILIRUB SERPL-MCNC: 0.7 MG/DL (ref 0.1–2)
BUN BLD-MCNC: 11 MG/DL (ref 9–23)
CALCIUM BLD-MCNC: 9.7 MG/DL (ref 8.5–10.1)
CHLORIDE SERPL-SCNC: 105 MMOL/L (ref 98–112)
CO2 SERPL-SCNC: 28 MMOL/L (ref 21–32)
CREAT BLD-MCNC: 1.04 MG/DL
EGFRCR SERPLBLD CKD-EPI 2021: 58 ML/MIN/1.73M2 (ref 60–?)
FASTING STATUS PATIENT QL REPORTED: NO
GLOBULIN PLAS-MCNC: 3.8 G/DL (ref 2.8–4.4)
GLUCOSE BLD-MCNC: 78 MG/DL (ref 70–99)
OSMOLALITY SERPL CALC.SUM OF ELEC: 282 MOSM/KG (ref 275–295)
POTASSIUM SERPL-SCNC: 5.2 MMOL/L (ref 3.5–5.1)
PROT SERPL-MCNC: 7.5 G/DL (ref 6.4–8.2)
SODIUM SERPL-SCNC: 137 MMOL/L (ref 136–145)

## 2023-11-08 PROCEDURE — 80053 COMPREHEN METABOLIC PANEL: CPT

## 2023-11-08 PROCEDURE — 36415 COLL VENOUS BLD VENIPUNCTURE: CPT

## 2023-11-09 DIAGNOSIS — Z79.899 ENCOUNTER FOR LONG-TERM (CURRENT) DRUG USE: ICD-10-CM

## 2023-11-09 DIAGNOSIS — E78.00 HYPERCHOLESTEREMIA: Primary | ICD-10-CM

## 2024-02-22 ENCOUNTER — OFFICE VISIT (OUTPATIENT)
Dept: FAMILY MEDICINE CLINIC | Facility: CLINIC | Age: 69
End: 2024-02-22
Payer: MEDICARE

## 2024-02-22 VITALS
HEART RATE: 94 BPM | WEIGHT: 133.13 LBS | OXYGEN SATURATION: 98 % | SYSTOLIC BLOOD PRESSURE: 110 MMHG | DIASTOLIC BLOOD PRESSURE: 72 MMHG | BODY MASS INDEX: 20 KG/M2 | TEMPERATURE: 97 F

## 2024-02-22 DIAGNOSIS — R35.0 URINARY FREQUENCY: Primary | ICD-10-CM

## 2024-02-22 DIAGNOSIS — N30.00 ACUTE CYSTITIS WITHOUT HEMATURIA: ICD-10-CM

## 2024-02-22 LAB
BILIRUBIN: NEGATIVE
GLUCOSE (URINE DIPSTICK): NEGATIVE MG/DL
KETONES (URINE DIPSTICK): NEGATIVE MG/DL
MULTISTIX LOT#: ABNORMAL NUMERIC
NITRITE, URINE: POSITIVE
OCCULT BLOOD: NEGATIVE
PH, URINE: 7.5 (ref 4.5–8)
PROTEIN (URINE DIPSTICK): NEGATIVE MG/DL
SPECIFIC GRAVITY: 1.02 (ref 1–1.03)
URINE-COLOR: YELLOW
UROBILINOGEN,SEMI-QN: 0.2 MG/DL (ref 0–1.9)

## 2024-02-22 PROCEDURE — 87077 CULTURE AEROBIC IDENTIFY: CPT

## 2024-02-22 PROCEDURE — 87186 SC STD MICRODIL/AGAR DIL: CPT

## 2024-02-22 PROCEDURE — 87086 URINE CULTURE/COLONY COUNT: CPT

## 2024-02-22 RX ORDER — FOLIC ACID 0.8 MG
500 TABLET ORAL AS DIRECTED
COMMUNITY

## 2024-02-22 RX ORDER — AMOXICILLIN AND CLAVULANATE POTASSIUM 500; 125 MG/1; MG/1
1 TABLET, FILM COATED ORAL 2 TIMES DAILY
Qty: 14 TABLET | Refills: 0 | Status: SHIPPED | OUTPATIENT
Start: 2024-02-22 | End: 2024-02-29

## 2024-02-22 NOTE — PROGRESS NOTES
HPI:   Irma is a 68 yr. Old female here today with bilateral low back pain, suprapubic abdominal pain, and urinary frequency. Patient denies fever, chills, n/v/d, diarrhea, discharge, hematuria.           Current Outpatient Medications   Medication Sig Dispense Refill    Magnesium 500 MG Oral Cap Take 500 mg by mouth As Directed.      amoxicillin clavulanate (AUGMENTIN) 500-125 MG Oral Tab Take 1 tablet by mouth in the morning and 1 tablet before bedtime. Do all this for 7 days. 14 tablet 0    rosuvastatin 20 MG Oral Tab Take 1 tablet (20 mg total) by mouth nightly. 90 tablet 3    Probiotic Product (Direct Hit) Take by mouth.      estradiol 0.1 MG/GM Vaginal Cream Place 0.5 g vaginally twice a week. 1 each 0    clobetasol 0.05 % External Cream Apply 1 Application topically nightly as needed (FOR ANAL ITCHING). 15 g 0    aspirin 81 MG Oral Tab Take 1 tablet (81 mg total) by mouth daily. 1 tablet 0    Melatonin 5 MG Oral Chew Tab Chew 5 mg by mouth nightly.      cetirizine (ZYRTEC) 10 MG Oral Tab Take 1 tablet (10 mg total) by mouth every other day.      Cholecalciferol (VITAMIN D3) 3000 UNITS Oral Tab Take 1 tablet by mouth daily. 1 tablet 0      Past Medical History:   Diagnosis Date    Elevated liver enzymes     resolved Sept, neg w/u including liver biopsy    GERD (gastroesophageal reflux disease)     History of blood transfusion 1978    Hypercholesteremia     Osteopenia     Retinal detachment     left    Vitamin D deficiency       Past Surgical History:   Procedure Laterality Date    COLONOSCOPY  03/02/2011    normal    COLONOSCOPY  02/01/2021    normal    D & C      DEXA, AXIAL SITE (SPINE, HIPS, PELVIS)  09/22/2017    T-score -2.3 total hip, -1.9 FN    DEXA, AXIAL SITE (SPINE, HIPS, PELVIS)  09/20/2019    T score is -2.4 at femoral neck    DXA BONE DENSITY STUDY AXIAL SKELETON  04/05/2022    T score -1.5 in the lumbar spine, -2.3 left femoral neck    LASIK  1998    NEEDLE BIOPSY LIVER  08/09/2017     OTHER SURGICAL HISTORY  07/28/2017    liver biopsy    OTHER SURGICAL HISTORY  02/28/2020    retina repair on both eyes    STRESS ECHO (EXERCISE)  09/19/2020    negative    UPPER GI ENDOSCOPY - REFERRAL  01/03/2014    NEG      Family History   Problem Relation Age of Onset    Asthma Son     Heart Disorder Son         a-fib    Cancer Father         COLON in his 80s, PROSTATE    Heart Disorder Father     Heart Disorder Mother     Diabetes Mother     Obesity Mother     Heart Disorder Sister     Obesity Sister     Other (MULTIPLE SCLEROSIS) Brother     Cancer Sister         MYELOMA    Arthritis Sister         RHEUMATOID ATHRITIS    Heart Disorder Brother         MI    Cancer Brother         bladder    Cancer Brother 62        colorectal cancer      Social History     Socioeconomic History    Marital status:    Tobacco Use    Smoking status: Never    Smokeless tobacco: Never   Vaping Use    Vaping Use: Never used   Substance and Sexual Activity    Alcohol use: No    Drug use: No   Other Topics Concern    Caffeine Concern No    Exercise Yes    Seat Belt Yes    Special Diet No    Stress Concern No    Weight Concern No         REVIEW OF SYSTEMS:   Review of Systems   Constitutional:  Negative for chills, fatigue and fever.   Cardiovascular: Negative.    Gastrointestinal:  Positive for abdominal pain (see hpi). Negative for diarrhea, nausea and vomiting.   Genitourinary:  Positive for frequency and urgency. Negative for dysuria, flank pain, hematuria and vaginal discharge.   Skin: Negative.        EXAM:   /72   Pulse 94   Temp 97.3 °F (36.3 °C) (Tympanic)   Wt 133 lb 2 oz (60.4 kg)   SpO2 98%   BMI 20.24 kg/m²   Physical Exam  Vitals and nursing note reviewed.   Constitutional:       General: She is not in acute distress.     Appearance: Normal appearance.   HENT:      Head: Atraumatic.      Mouth/Throat:      Mouth: Mucous membranes are moist.      Pharynx: Oropharynx is clear.   Cardiovascular:       Rate and Rhythm: Normal rate.      Pulses: Normal pulses.   Pulmonary:      Effort: Pulmonary effort is normal.   Abdominal:      Tenderness: There is no right CVA tenderness or left CVA tenderness.   Skin:     General: Skin is warm and dry.   Neurological:      General: No focal deficit present.      Mental Status: She is alert.         ASSESSMENT AND PLAN:   Diagnoses and all orders for this visit:    Urinary frequency  -     Urine Dip, auto without Micro  -     Urine Culture, Routine [E]; Future    Acute cystitis without hematuria  -     amoxicillin clavulanate (AUGMENTIN) 500-125 MG Oral Tab; Take 1 tablet by mouth in the morning and 1 tablet before bedtime. Do all this for 7 days.     -Discussed in office u/a suspicious for infection.  Culture sent. Medication as prescribed.  Recommend push fluids.  -Return for worsening, call with questions or problems.  -Patient verbalized understanding and in agreement with treatment plan.    GEORGE Castellano

## 2024-03-21 ENCOUNTER — OFFICE VISIT (OUTPATIENT)
Dept: FAMILY MEDICINE CLINIC | Facility: CLINIC | Age: 69
End: 2024-03-21
Payer: MEDICARE

## 2024-03-21 VITALS
DIASTOLIC BLOOD PRESSURE: 96 MMHG | OXYGEN SATURATION: 100 % | BODY MASS INDEX: 20.24 KG/M2 | SYSTOLIC BLOOD PRESSURE: 148 MMHG | WEIGHT: 132 LBS | HEIGHT: 67.75 IN | RESPIRATION RATE: 12 BRPM | HEART RATE: 96 BPM | TEMPERATURE: 97 F

## 2024-03-21 DIAGNOSIS — M85.852 OSTEOPENIA OF LEFT HIP: ICD-10-CM

## 2024-03-21 DIAGNOSIS — R20.2 BILATERAL ARM NUMBNESS AND TINGLING WHILE SLEEPING: ICD-10-CM

## 2024-03-21 DIAGNOSIS — I10 ESSENTIAL HYPERTENSION: ICD-10-CM

## 2024-03-21 DIAGNOSIS — Z86.69 HISTORY OF RETINAL DETACHMENT: ICD-10-CM

## 2024-03-21 DIAGNOSIS — K58.2 IRRITABLE BOWEL SYNDROME WITH BOTH CONSTIPATION AND DIARRHEA: ICD-10-CM

## 2024-03-21 DIAGNOSIS — R42 ORTHOSTATIC LIGHTHEADEDNESS: ICD-10-CM

## 2024-03-21 DIAGNOSIS — R00.2 PALPITATIONS: ICD-10-CM

## 2024-03-21 DIAGNOSIS — N18.31 CHRONIC KIDNEY DISEASE (CKD) STAGE G3A/A1, MODERATELY DECREASED GLOMERULAR FILTRATION RATE (GFR) BETWEEN 45-59 ML/MIN/1.73 SQUARE METER AND ALBUMINURIA CREATININE RATIO LESS THAN 30 MG/G (HCC): ICD-10-CM

## 2024-03-21 DIAGNOSIS — E78.00 HYPERCHOLESTEREMIA: ICD-10-CM

## 2024-03-21 DIAGNOSIS — R20.0 BILATERAL ARM NUMBNESS AND TINGLING WHILE SLEEPING: ICD-10-CM

## 2024-03-21 DIAGNOSIS — Z00.00 ENCOUNTER FOR ANNUAL HEALTH EXAMINATION: Primary | ICD-10-CM

## 2024-03-21 DIAGNOSIS — Z12.31 VISIT FOR SCREENING MAMMOGRAM: ICD-10-CM

## 2024-03-21 DIAGNOSIS — Z80.0 FAMILY HISTORY OF COLON CANCER IN FATHER: ICD-10-CM

## 2024-03-21 DIAGNOSIS — R91.1 LUNG NODULE: ICD-10-CM

## 2024-03-21 DIAGNOSIS — I25.10 ASHD (ARTERIOSCLEROTIC HEART DISEASE): ICD-10-CM

## 2024-03-21 DIAGNOSIS — L73.1 INGROWN HAIR: ICD-10-CM

## 2024-03-21 RX ORDER — LISINOPRIL 5 MG/1
5 TABLET ORAL DAILY
Qty: 30 TABLET | Refills: 1 | Status: SHIPPED | OUTPATIENT
Start: 2024-03-21

## 2024-03-21 NOTE — PATIENT INSTRUCTIONS
Irma Moya's SCREENING SCHEDULE   Tests on this list are recommended by your physician but may not be covered, or covered at this frequency, by your insurer.   Please check with your insurance carrier before scheduling to verify coverage.   PREVENTATIVE SERVICES FREQUENCY &  COVERAGE DETAILS LAST COMPLETION DATE   Diabetes Screening    Fasting Blood Sugar /  Glucose    One screening every 12 months if never tested or if previously tested but not diagnosed with pre-diabetes   One screening every 6 months if diagnosed with pre-diabetes Lab Results   Component Value Date    GLU 78 11/08/2023        Cardiovascular Disease Screening    Lipid Panel  Cholesterol  Lipoprotein (HDL)  Triglycerides Covered every 5 years for all Medicare beneficiaries without apparent signs or symptoms of cardiovascular disease Lab Results   Component Value Date    CHOLEST 187 06/14/2023    HDL 74 (A) 06/14/2023    LDL 92 06/14/2023    TRIG 107 06/14/2023         Electrocardiogram (EKG)   Covered if needed at Welcome to Medicare, and non-screening if indicated for medical reasons 02/16/2018      Ultrasound Screening for Abdominal Aortic Aneurysm (AAA) Covered once in a lifetime for one of the following risk factors    Men who are 65-75 years old and have ever smoked    Anyone with a family history -     Colorectal Cancer Screening  Covered for ages 50-85; only need ONE of the following:    Colonoscopy   Covered every 10 years    Covered every 2 years if patient is at high risk or previous colonoscopy was abnormal 02/01/2021    Health Maintenance   Topic Date Due    Colorectal Cancer Screening  02/01/2031       Flexible Sigmoidoscopy   Covered every 4 years -    Fecal Occult Blood Test Covered annually -   Bone Density Screening    Bone density screening    Covered every 2 years after age 65 if diagnosed with risk of osteoporosis or estrogen deficiency.    Covered yearly for long-term glucocorticoid medication use (Steroids) Last Dexa  Scan:    XR DEXA BONE DENSITOMETRY (CPT=77080) 04/05/2022      No recommendations at this time   Pap and Pelvic    Pap   Covered every 2 years for women at normal risk; Annually if at high risk -  No recommendations at this time    Chlamydia Annually if high risk -  No recommendations at this time   Screening Mammogram    Mammogram     Recommend annually for all female patients aged 40 and older    One baseline mammogram covered for patients aged 35-39 06/01/2023    Health Maintenance   Topic Date Due    Mammogram  06/01/2024       Immunizations    Influenza Covered once per flu season  Please get every year 10/15/2023  No recommendations at this time    Pneumococcal Each vaccine ([id number] & [id number]) covered once after 65 Prevnar 13: 02/14/2018    Oazaqsdqj06: 02/21/2020     No recommendations at this time    Hepatitis B One screening covered for patients with certain risk factors   -  No recommendations at this time    Tetanus Toxoid Not covered by Medicare Part B unless medically necessary (cut with metal); may be covered with your pharmacy prescription benefits -    Tetanus, Diptheria and Pertusis TD and TDaP Not covered by Medicare Part B -  No recommendations at this time    Zoster Not covered by Medicare Part B; may be covered with your pharmacy  prescription benefits 01/26/2015  No recommendations at this time      1. Encounter for annual health examination  I reviewed age-appropriate preventive health screening exams as well as advanced directives and immunizations, continue annual dilated retinal exams and glaucoma screening  - Lipid Panel [E]; Future  - Comp Metabolic Panel (14) [E]; Future  - [facility] YAMILA 2D+3D SCREENING BILAT (CPT=77067/38647); Future    2. Hypercholesteremia  Reviewed goals for lipids.  Check labs annually  - Lipid Panel [E]; Future    3. ASHD (arteriosclerotic heart disease)-calcium score 55 on heart scan February 2020  Monitor clinically, continue low-dose aspirin therapy as well as  statin therapy    4. Family history of colon cancer in father- @ age 80, brother @ 62  Continue 5-year surveillance    5. Irritable bowel syndrome with both constipation and diarrhea  Continue probiotics and fiber supplements    6. Osteopenia of left hip  Discussed osteoporosis as well as monitoring and avoidance strategies.  Will defer further bone density testing this year as patient would be unlikely to want more prescription medication, continue daily weightbearing activity as well as vitamin D supplement    7. History of retinal detachment- left  Follow-up with ophthalmology    8. Chronic kidney disease (CKD) stage G3a/A1, moderately decreased glomerular filtration rate (GFR) between 45-59 mL/min/1.73 square meter and albuminuria creatinine ratio less than 30 mg/g (HCC)  Discussed importance of increased daily fluid intake, monitor annually  - Comp Metabolic Panel (14) [E]; Future    9. Visit for screening mammogram  Continue monthly self breast exams and annual mammograms  - [facility] YAMILA 2D+3D SCREENING BILAT (CPT=77067/98180); Future    10. Lung nodule-ROSANNA, ? vascular component  Reviewed previous chest CT results.  Let patient schedule CT angiogram of the chest for further evaluation and surveillance of nodule  - CT ANGIOGRAPHY, CHEST (QKS=62305); Future    11. Ingrown hair  Discussed diagnosis and avoidance strategies.  Would avoid any additional digital manipulation    12. Palpitations  Discussed benign nature of premature heartbeats.  As patient is unaware of them unless she is checking her blood pressure or pulse, would defer any meds    13. Essential hypertension  I reviewed goals for blood pressure as well as conservative management of hypertension including sodium restriction, daily aerobic activity, alcohol moderation, smoking cessation, and maintaining ideal body weight.  Will start lisinopril 5 mg daily.  Advised patient to continue to monitor blood pressure once a day taking every 3 consecutive  readings at various times of the day    14. Orthostatic lightheadedness  Discussed importance of avoiding breath-holding while exercising and increase fluid intake before and during activity    15. Bilateral arm numbness and tingling while sleeping  Discussed likely postural nature of symptoms.

## 2024-03-21 NOTE — H&P
HPI:   Irma Moya is a 69 year old female   Chief Complaint   Patient presents with    Physical    Lipids    Medication Follow-Up   Bp at home \"all over the place\", \"irregular heartbeats\", only noticed when taking her bp or checking pulse, asymptomatic  Bp 156-107/80-60  Bump on back of head x 1 week  Occ \"hitch\" in her breath when talking  Spot on lung, recheck  Exercises 5 days per week, occ lightheaded during the aerobic class  Arms will go numb on the top arm when sleeping on her side for a long time, resolves w/ position change    Wt Readings from Last 6 Encounters:   03/21/24 132 lb (59.9 kg)   02/22/24 133 lb 2 oz (60.4 kg)   08/30/23 131 lb (59.4 kg)   06/14/23 130 lb (59 kg)   03/08/23 134 lb (60.8 kg)   12/20/22 132 lb (59.9 kg)     Body mass index is 20.22 kg/m².     Cholesterol, Total (mg/dL)   Date Value   10/27/2022 195   04/26/2022 191   10/22/2021 192     Total Cholesterol (POC) (mg/dl)   Date Value   06/14/2023 187     HDL Cholesterol (mg/dL)   Date Value   10/27/2022 74 (H)   04/26/2022 74 (H)   10/22/2021 80 (H)     HDL (POC) (mg/dl)   Date Value   06/14/2023 74 (A)     LDL Cholesterol (mg/dL)   Date Value   10/27/2022 102 (H)   04/26/2022 100 (H)   10/22/2021 98     LDL (POC) (mg/dl)   Date Value   06/14/2023 92     AST (U/L)   Date Value   11/08/2023 15   04/26/2022 13 (L)   10/22/2021 20     ALT (U/L)   Date Value   11/08/2023 17   04/26/2022 20   10/22/2021 20        Current Outpatient Medications   Medication Sig Dispense Refill    lisinopril 5 MG Oral Tab Take 1 tablet (5 mg total) by mouth daily. 30 tablet 1    Magnesium 500 MG Oral Cap Take 500 mg by mouth As Directed.      rosuvastatin 20 MG Oral Tab Take 1 tablet (20 mg total) by mouth nightly. 90 tablet 3    Probiotic Product (Ayla) Take by mouth.      estradiol 0.1 MG/GM Vaginal Cream Place 0.5 g vaginally twice a week. 1 each 0    clobetasol 0.05 % External Cream Apply 1 Application topically nightly as needed  (FOR ANAL ITCHING). 15 g 0    aspirin 81 MG Oral Tab Take 1 tablet (81 mg total) by mouth daily. 1 tablet 0    Melatonin 5 MG Oral Chew Tab Chew 5 mg by mouth nightly.      cetirizine (ZYRTEC) 10 MG Oral Tab Take 1 tablet (10 mg total) by mouth every other day.      Cholecalciferol (VITAMIN D3) 3000 UNITS Oral Tab Take 1 tablet by mouth daily. 1 tablet 0     Allergies   Allergen Reactions    Bactrim      Swollen throat     Ciprofloxacin HIVES    Keflex [Cephalexin] OTHER (SEE COMMENTS)     Possible association with elevated LFTs    Macrobid [Nitrofurantoin] ITCHING     Delayed urticarial rash several days after completing a course of medication twice        Past Medical History:   Diagnosis Date    Elevated liver enzymes     resolved Sept, neg w/u including liver biopsy    GERD (gastroesophageal reflux disease)     History of blood transfusion 1978    Hypercholesteremia     Osteopenia     Retinal detachment     left    Vitamin D deficiency       Past Surgical History:   Procedure Laterality Date    COLONOSCOPY  03/02/2011    normal    COLONOSCOPY  02/01/2021    normal    D & C      DEXA, AXIAL SITE (SPINE, HIPS, PELVIS)  09/22/2017    T-score -2.3 total hip, -1.9 FN    DEXA, AXIAL SITE (SPINE, HIPS, PELVIS)  09/20/2019    T score is -2.4 at femoral neck    DXA BONE DENSITY STUDY AXIAL SKELETON  04/05/2022    T score -1.5 in the lumbar spine, -2.3 left femoral neck    LASIK  1998    NEEDLE BIOPSY LIVER  08/09/2017    OTHER SURGICAL HISTORY  07/28/2017    liver biopsy    OTHER SURGICAL HISTORY  02/28/2020    retina repair on both eyes    STRESS ECHO (EXERCISE)  09/19/2020    negative    UPPER GI ENDOSCOPY - REFERRAL  01/03/2014    NEG      Family History   Problem Relation Age of Onset    Asthma Son     Heart Disorder Son         a-fib    Cancer Father         COLON in his 80s, PROSTATE    Heart Disorder Father     Heart Disorder Mother     Diabetes Mother     Obesity Mother     Heart Disorder Sister     Obesity  Sister     Other (MULTIPLE SCLEROSIS) Brother     Cancer Sister         MYELOMA    Arthritis Sister         RHEUMATOID ATHRITIS    Heart Disorder Brother         MI    Cancer Brother         bladder    Cancer Brother 62        colorectal cancer        Social History     Socioeconomic History    Marital status:    Tobacco Use    Smoking status: Never    Smokeless tobacco: Never   Vaping Use    Vaping Use: Never used   Substance and Sexual Activity    Alcohol use: No    Drug use: No   Other Topics Concern    Caffeine Concern No    Exercise Yes    Seat Belt Yes    Special Diet No    Stress Concern No    Weight Concern No     Specialists: Dr Earl- urogyne, Dr Ford, Dr Ríos- [facility]  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 : +. Children: 3.   Exercise: Walks 2 miles 5 times per week, yoga off and on.  Diet: generally healthy  Gyne Hx: LMP:  na,  G 3, P 3003,  Last Mammogram:  6/1/23, pap 2018     REVIEW OF SYSTEMS:   GENERAL: generally feels well, no unusual fatigue,no excessive daytime drowsiness, good appetite, weight has been stable  SKIN:  see hpi  EYES:denies blurred vision or double vision, glasses/ contacts: +, last eye exam : 3/8/24 @ [facility], 3/16/23 @ [facility]  HEENT: denies nasal congestion, pnd, or ST, denies snoring and reported periods of apnea, denies hearing deficits  LUNGS: denies shortness of breath with exertion, no coughing or wheezing  CARDIOVASCULAR: denies chest pain on exertion, palpations, anginal equivalent symptoms, no claudication  GI: denies abdominal pain,denies heartburn, patient notes some change in her bowel habits occasionally being loose alternating with harder stools, improved on daily fiber supplement  : denies dysuria, vaginal discharge or itching, no uterine bleeding, + less stess incontinence ,some overflow incontinence, using topical ERT 2 x weekly, doing pelvic floor HEP  MUSCULOSKELETAL: denies back or joint pain-back pain controlled w/ HEP  NEURO: denies  tremors, dizziness, numbness, tingling, muscular weakness, patient notes occasional fleeting headaches, usually in the temporal regions  PSYCHE: denies depression or anxiety, denies any sleep difficulty, less stressors over last 6 months    Have you often been bothered by feeling down, depressed of hopeless? no                  Have you often been bothered by little interest or pleasure in doing things? no  HEMATOLOGIC: denies unexplained bruising or bleeding  ENDOCRINE: denies heat or cold intolerance , no unexplained wt loss or gain  FUNCTIONAL ABILITY: Do you need help with preparing meals? no, Dressing? no, Hygiene? no, Using the bathroom? no, Transportation? no, Shopping? no, Taking medications? no, Banking? no  SAFETY: Does your home have throw rugs? no, Adequate lighting? yes, Grab bars in bathroom/shower/tub? yes,  Handrails on stairs? yes, Some alarms? yes   Get and Go test > 12 seconds?  no  EXAM:   BP (!) 148/96   Pulse 96   Temp 96.8 °F (36 °C) (Temporal)   Resp 12   Ht 5' 7.75\" (1.721 m)   Wt 132 lb (59.9 kg)   SpO2 100%   BMI 20.22 kg/m²   Body mass index is 20.22 kg/m².   GENERAL: well developed, well nourished,in no apparent distress  SKIN: no rashes,no suspicious lesions, 2 fleshy epidermal nevi on back, + scattered superficial telangiectasias across the upper back and shoulder, small excoriated ingrown hair right occipital area  HEENT: Ears:  TMs intact, canals clear,  Nose: turbinates clear,Septum midline, Pharynx: no pnd, erythema, or airway obstruction  EYES:PERRLA, EOMI, Fundi: benign,conjunctiva are clear  NECK: supple,no adenopathy,no bruits, no thyromegaly or palpable nodules  BREASTS: Nipples are everted without discharge, no palpable masses, no dimpling or retractions, no axillary supraclavicular lymphadenopathy palpable  LUNGS: clear to auscultation, no w/r/r  CARDIO: RRR without murmur, occasional asymptomatic premature beats, strong peripheral pulses, no peripheral edema  GI:  good BS's,no masses, HSM , nontender  MUSCULOSKELETAL: back is non-tender,FROM of the back, flexion: fingers to floor  EXTREMITIES: FROM of all joints tested,  no cyanosis, clubbing or edema, no varicosities, bilateral upper extremities: Full range of all joints tested, strong peripheral pulses, negative Tinel's sign at wrist and elbows  NEURO: Oriented times three,CN 2-12 are intact,motor and sensory are grossly intact, Gait: normal, DTRs +2/4 bilaterally, Monofilament testing intact on plantar aspect of feet  PSYCH:  Speech clear and coherent, judgement: good, appearance: neat, Affect:slightly anxious  ASSESSMENT AND PLAN:   Irma Moya is a 69 year old female   Encounter Diagnoses   Name Primary?    Encounter for annual health examination Yes    Hypercholesteremia     ASHD (arteriosclerotic heart disease)-calcium score 55 on heart scan February 2020     Family history of colon cancer in father- @ age 80, brother @ 62     Irritable bowel syndrome with both constipation and diarrhea     Osteopenia of left hip     History of retinal detachment- left     Chronic kidney disease (CKD) stage G3a/A1, moderately decreased glomerular filtration rate (GFR) between 45-59 mL/min/1.73 square meter and albuminuria creatinine ratio less than 30 mg/g ([facility])     Visit for screening mammogram     Lung nodule-ROSANNA, ? vascular component     Ingrown hair     Palpitations     Essential hypertension     Orthostatic lightheadedness     Bilateral arm numbness and tingling while sleeping      Orders Placed This Encounter   Procedures    Lipid Panel [E]    Comp Metabolic Panel (14) [E]     Meds & Refills for this Visit:  Requested Prescriptions     Signed Prescriptions Disp Refills    lisinopril 5 MG Oral Tab 30 tablet 1     Sig: Take 1 tablet (5 mg total) by mouth daily.     Imaging & Consults:  [facility] YAMILA 2D+3D SCREENING BILAT (CPT=77067/65572)  CT ANGIOGRAPHY, CHEST (XIC=49959)  No follow-ups on file.  Patient Instructions     Irma PRAKASH  Moya's SCREENING SCHEDULE   Tests on this list are recommended by your physician but may not be covered, or covered at this frequency, by your insurer.   Please check with your insurance carrier before scheduling to verify coverage.   PREVENTATIVE SERVICES FREQUENCY &  COVERAGE DETAILS LAST COMPLETION DATE   Diabetes Screening    Fasting Blood Sugar /  Glucose    One screening every 12 months if never tested or if previously tested but not diagnosed with pre-diabetes   One screening every 6 months if diagnosed with pre-diabetes Lab Results   Component Value Date    GLU 78 11/08/2023        Cardiovascular Disease Screening    Lipid Panel  Cholesterol  Lipoprotein (HDL)  Triglycerides Covered every 5 years for all Medicare beneficiaries without apparent signs or symptoms of cardiovascular disease Lab Results   Component Value Date    CHOLEST 187 06/14/2023    HDL 74 (A) 06/14/2023    LDL 92 06/14/2023    TRIG 107 06/14/2023         Electrocardiogram (EKG)   Covered if needed at Welcome to Medicare, and non-screening if indicated for medical reasons 02/16/2018      Ultrasound Screening for Abdominal Aortic Aneurysm (AAA) Covered once in a lifetime for one of the following risk factors    Men who are 65-75 years old and have ever smoked    Anyone with a family history -     Colorectal Cancer Screening  Covered for ages 50-85; only need ONE of the following:    Colonoscopy   Covered every 10 years    Covered every 2 years if patient is at high risk or previous colonoscopy was abnormal 02/01/2021    Health Maintenance   Topic Date Due    Colorectal Cancer Screening  02/01/2031       Flexible Sigmoidoscopy   Covered every 4 years -    Fecal Occult Blood Test Covered annually -   Bone Density Screening    Bone density screening    Covered every 2 years after age 65 if diagnosed with risk of osteoporosis or estrogen deficiency.    Covered yearly for long-term glucocorticoid medication use (Steroids) Last Dexa Scan:    XR  DEXA BONE DENSITOMETRY (CPT=77080) 04/05/2022      No recommendations at this time   Pap and Pelvic    Pap   Covered every 2 years for women at normal risk; Annually if at high risk -  No recommendations at this time    Chlamydia Annually if high risk -  No recommendations at this time   Screening Mammogram    Mammogram     Recommend annually for all female patients aged 40 and older    One baseline mammogram covered for patients aged 35-39 06/01/2023    Health Maintenance   Topic Date Due    Mammogram  06/01/2024       Immunizations    Influenza Covered once per flu season  Please get every year 10/15/2023  No recommendations at this time    Pneumococcal Each vaccine ([id number] & [id number]) covered once after 65 Prevnar 13: 02/14/2018    Omkscmcqq35: 02/21/2020     No recommendations at this time    Hepatitis B One screening covered for patients with certain risk factors   -  No recommendations at this time    Tetanus Toxoid Not covered by Medicare Part B unless medically necessary (cut with metal); may be covered with your pharmacy prescription benefits -    Tetanus, Diptheria and Pertusis TD and TDaP Not covered by Medicare Part B -  No recommendations at this time    Zoster Not covered by Medicare Part B; may be covered with your pharmacy  prescription benefits 01/26/2015  No recommendations at this time      1. Encounter for annual health examination  I reviewed age-appropriate preventive health screening exams as well as advanced directives and immunizations, continue annual dilated retinal exams and glaucoma screening  - Lipid Panel [E]; Future  - Comp Metabolic Panel (14) [E]; Future  - [facility] YAMILA 2D+3D SCREENING BILAT (CPT=77067/87468); Future    2. Hypercholesteremia  Reviewed goals for lipids.  Check labs annually  - Lipid Panel [E]; Future    3. ASHD (arteriosclerotic heart disease)-calcium score 55 on heart scan February 2020  Monitor clinically, continue low-dose aspirin therapy as well as statin  therapy    4. Family history of colon cancer in father- @ age 80, brother @ 62  Continue 5-year surveillance    5. Irritable bowel syndrome with both constipation and diarrhea  Continue probiotics and fiber supplements    6. Osteopenia of left hip  Discussed osteoporosis as well as monitoring and avoidance strategies.  Will defer further bone density testing this year as patient would be unlikely to want more prescription medication, continue daily weightbearing activity as well as vitamin D supplement    7. History of retinal detachment- left  Follow-up with ophthalmology    8. Chronic kidney disease (CKD) stage G3a/A1, moderately decreased glomerular filtration rate (GFR) between 45-59 mL/min/1.73 square meter and albuminuria creatinine ratio less than 30 mg/g (HCC)  Discussed importance of increased daily fluid intake, monitor annually  - Comp Metabolic Panel (14) [E]; Future    9. Visit for screening mammogram  Continue monthly self breast exams and annual mammograms  - [facility] YAMILA 2D+3D SCREENING BILAT (CPT=77067/67111); Future    10. Lung nodule-ROSANNA, ? vascular component  Reviewed previous chest CT results.  Let patient schedule CT angiogram of the chest for further evaluation and surveillance of nodule  - CT ANGIOGRAPHY, CHEST (JQN=11396); Future    11. Ingrown hair  Discussed diagnosis and avoidance strategies.  Would avoid any additional digital manipulation    12. Palpitations  Discussed benign nature of premature heartbeats.  As patient is unaware of them unless she is checking her blood pressure or pulse, would defer any meds    13. Essential hypertension  I reviewed goals for blood pressure as well as conservative management of hypertension including sodium restriction, daily aerobic activity, alcohol moderation, smoking cessation, and maintaining ideal body weight.  Will start lisinopril 5 mg daily.  Advised patient to continue to monitor blood pressure once a day taking every 3 consecutive readings at  various times of the day    14. Orthostatic lightheadedness  Discussed importance of avoiding breath-holding while exercising and increase fluid intake before and during activity    15. Bilateral arm numbness and tingling while sleeping  Discussed likely postural nature of symptoms.    Ck Magallanes DO, FAAFP

## 2024-03-22 ENCOUNTER — LABORATORY ENCOUNTER (OUTPATIENT)
Dept: LAB | Age: 69
End: 2024-03-22
Attending: FAMILY MEDICINE
Payer: MEDICARE

## 2024-03-22 DIAGNOSIS — N18.31 CHRONIC KIDNEY DISEASE (CKD) STAGE G3A/A1, MODERATELY DECREASED GLOMERULAR FILTRATION RATE (GFR) BETWEEN 45-59 ML/MIN/1.73 SQUARE METER AND ALBUMINURIA CREATININE RATIO LESS THAN 30 MG/G (HCC): ICD-10-CM

## 2024-03-22 DIAGNOSIS — E78.00 HYPERCHOLESTEREMIA: ICD-10-CM

## 2024-03-22 DIAGNOSIS — Z00.00 ENCOUNTER FOR ANNUAL HEALTH EXAMINATION: ICD-10-CM

## 2024-03-22 LAB
ALBUMIN SERPL-MCNC: 4 G/DL (ref 3.4–5)
ALBUMIN/GLOB SERPL: 1.3 {RATIO} (ref 1–2)
ALP LIVER SERPL-CCNC: 68 U/L
ALT SERPL-CCNC: 50 U/L
ANION GAP SERPL CALC-SCNC: 4 MMOL/L (ref 0–18)
AST SERPL-CCNC: 39 U/L (ref 15–37)
BILIRUB SERPL-MCNC: 0.5 MG/DL (ref 0.1–2)
BUN BLD-MCNC: 14 MG/DL (ref 9–23)
CALCIUM BLD-MCNC: 9.6 MG/DL (ref 8.5–10.1)
CHLORIDE SERPL-SCNC: 103 MMOL/L (ref 98–112)
CHOLEST SERPL-MCNC: 176 MG/DL (ref ?–200)
CO2 SERPL-SCNC: 29 MMOL/L (ref 21–32)
CREAT BLD-MCNC: 1.1 MG/DL
EGFRCR SERPLBLD CKD-EPI 2021: 54 ML/MIN/1.73M2 (ref 60–?)
FASTING PATIENT LIPID ANSWER: YES
FASTING STATUS PATIENT QL REPORTED: YES
GLOBULIN PLAS-MCNC: 3.1 G/DL (ref 2.8–4.4)
GLUCOSE BLD-MCNC: 100 MG/DL (ref 70–99)
HDLC SERPL-MCNC: 76 MG/DL (ref 40–59)
LDLC SERPL CALC-MCNC: 84 MG/DL (ref ?–100)
NONHDLC SERPL-MCNC: 100 MG/DL (ref ?–130)
OSMOLALITY SERPL CALC.SUM OF ELEC: 283 MOSM/KG (ref 275–295)
POTASSIUM SERPL-SCNC: 5.1 MMOL/L (ref 3.5–5.1)
PROT SERPL-MCNC: 7.1 G/DL (ref 6.4–8.2)
SODIUM SERPL-SCNC: 136 MMOL/L (ref 136–145)
TRIGL SERPL-MCNC: 88 MG/DL (ref 30–149)
VLDLC SERPL CALC-MCNC: 14 MG/DL (ref 0–30)

## 2024-03-22 PROCEDURE — 80061 LIPID PANEL: CPT

## 2024-03-22 PROCEDURE — 36415 COLL VENOUS BLD VENIPUNCTURE: CPT

## 2024-03-22 PROCEDURE — 80053 COMPREHEN METABOLIC PANEL: CPT

## 2024-03-23 DIAGNOSIS — E78.00 HYPERCHOLESTEREMIA: Primary | ICD-10-CM

## 2024-03-23 DIAGNOSIS — I10 ESSENTIAL HYPERTENSION: ICD-10-CM

## 2024-04-08 ENCOUNTER — HOSPITAL ENCOUNTER (OUTPATIENT)
Dept: CT IMAGING | Age: 69
Discharge: HOME | End: 2024-04-08
Attending: FAMILY MEDICINE
Payer: MEDICARE

## 2024-04-08 DIAGNOSIS — R91.1 LUNG NODULE: ICD-10-CM

## 2024-04-08 PROCEDURE — 71275 CT ANGIOGRAPHY CHEST: CPT | Performed by: FAMILY MEDICINE

## 2024-04-11 ENCOUNTER — PATIENT MESSAGE (OUTPATIENT)
Dept: FAMILY MEDICINE CLINIC | Facility: CLINIC | Age: 69
End: 2024-04-11

## 2024-04-11 NOTE — TELEPHONE ENCOUNTER
From: Irma Moya  To: Ck Magallanes  Sent: 4/11/2024 11:02 AM CDT  Subject: Cardiologist appointment    Dr ECKERT  I scheduled an appointment with Dr Castillo for May 17 in [place]. It was the earliest he had at that location. I don't want to go to [place]. They indicated he may be returning to [place].  Irma Moya

## 2024-04-17 ENCOUNTER — PATIENT MESSAGE (OUTPATIENT)
Dept: FAMILY MEDICINE CLINIC | Facility: CLINIC | Age: 69
End: 2024-04-17

## 2024-04-17 NOTE — TELEPHONE ENCOUNTER
Stop the lisinopril.  Obviously your blood pressure is dropping with positional changes.  When practical, wearing compression socks may help prevent the blood from going to your feet instead of your head.  Please let me know your positional blood pressure changes several days after stopping the lisinopril

## 2024-04-17 NOTE — TELEPHONE ENCOUNTER
From: Irma Moya  To: Ck Magallanes  Sent: 4/17/2024 11:24 AM CDT  Subject: Blood pressure    Dr. ECKERT,  I took a blood pressure test this morning at 5:56 while lying in bed: 136/98 HR 76. Stood up: 92/57 .   Those readings are one each, not an average. They were about 7 minutes apart because the monitor needs that much time to shut off. I had had to set the monitor up on my arm but tried to wait a bit before taking the first reading.   Second reading 10:48am: Reclining in a chair: 105/52 HR 74 Standing up: 83/59 . Again, readings were single and about 8 minutes apart. The standing one had to be restarted because of an error. I felt very lightheaded and unsteady when I stood up from the chair to take the reading.  I continued to lie down while waiting for the monitor to shut off before doing both standing tests.  My Lisinopril has only about 4 pills left.  Irma Moya

## 2024-04-21 ENCOUNTER — OFFICE VISIT (OUTPATIENT)
Dept: FAMILY MEDICINE CLINIC | Facility: CLINIC | Age: 69
End: 2024-04-21
Payer: MEDICARE

## 2024-04-21 VITALS
TEMPERATURE: 98 F | OXYGEN SATURATION: 98 % | HEART RATE: 72 BPM | DIASTOLIC BLOOD PRESSURE: 70 MMHG | BODY MASS INDEX: 19.4 KG/M2 | RESPIRATION RATE: 18 BRPM | HEIGHT: 68 IN | SYSTOLIC BLOOD PRESSURE: 132 MMHG | WEIGHT: 128 LBS

## 2024-04-21 DIAGNOSIS — R39.9 UTI SYMPTOMS: Primary | ICD-10-CM

## 2024-04-21 LAB
BILIRUBIN: NEGATIVE
GLUCOSE (URINE DIPSTICK): NEGATIVE MG/DL
KETONES (URINE DIPSTICK): NEGATIVE MG/DL
MULTISTIX LOT#: ABNORMAL NUMERIC
NITRITE, URINE: POSITIVE
PH, URINE: 6 (ref 4.5–8)
SPECIFIC GRAVITY: 1.02 (ref 1–1.03)
URINE-COLOR: YELLOW
UROBILINOGEN,SEMI-QN: 0.2 MG/DL (ref 0–1.9)

## 2024-04-21 PROCEDURE — 87186 SC STD MICRODIL/AGAR DIL: CPT | Performed by: NURSE PRACTITIONER

## 2024-04-21 PROCEDURE — 87086 URINE CULTURE/COLONY COUNT: CPT | Performed by: NURSE PRACTITIONER

## 2024-04-21 PROCEDURE — 99213 OFFICE O/P EST LOW 20 MIN: CPT | Performed by: NURSE PRACTITIONER

## 2024-04-21 PROCEDURE — 81003 URINALYSIS AUTO W/O SCOPE: CPT | Performed by: NURSE PRACTITIONER

## 2024-04-21 PROCEDURE — 87077 CULTURE AEROBIC IDENTIFY: CPT | Performed by: NURSE PRACTITIONER

## 2024-04-21 RX ORDER — AMOXICILLIN AND CLAVULANATE POTASSIUM 875; 125 MG/1; MG/1
1 TABLET, FILM COATED ORAL 2 TIMES DAILY
Qty: 14 TABLET | Refills: 0 | Status: SHIPPED | OUTPATIENT
Start: 2024-04-21 | End: 2024-04-28

## 2024-04-21 NOTE — PROGRESS NOTES
CHIEF COMPLAINT:     Chief Complaint   Patient presents with    UTI     Started last night, urgency burning          HPI:   Irma Moya is a 69 year old female who presents with symptoms of UTI. Complaining of urinary frequency, urgency, dysuria for last 2 days.  Denies flank pain, fever, hematuria, nausea, or vomiting.  Denies abdominal pain, pelvic pain, vaginal discharge, bleeding, itching, or concerns for std's. Tolerates PO well at home. No n/v/d.  Denies any other aggravating or relieving factors at home. Denies any other treatment attempts prior to arrival.       Current Outpatient Medications   Medication Sig Dispense Refill    amoxicillin clavulanate 875-125 MG Oral Tab Take 1 tablet by mouth 2 (two) times daily for 7 days. 14 tablet 0    Magnesium 500 MG Oral Cap Take 500 mg by mouth As Directed.      rosuvastatin 20 MG Oral Tab Take 1 tablet (20 mg total) by mouth nightly. 90 tablet 3    Probiotic Product ([employer]) Take by mouth.      estradiol 0.1 MG/GM Vaginal Cream Place 0.5 g vaginally twice a week. 1 each 0    clobetasol 0.05 % External Cream Apply 1 Application topically nightly as needed (FOR ANAL ITCHING). 15 g 0    aspirin 81 MG Oral Tab Take 1 tablet (81 mg total) by mouth daily. 1 tablet 0    Melatonin 5 MG Oral Chew Tab Chew 5 mg by mouth nightly.      cetirizine (ZYRTEC) 10 MG Oral Tab Take 1 tablet (10 mg total) by mouth every other day.      Cholecalciferol (VITAMIN D3) 3000 UNITS Oral Tab Take 1 tablet by mouth daily. 1 tablet 0      Past Medical History:    Elevated liver enzymes    resolved Sept, neg w/u including liver biopsy    GERD (gastroesophageal reflux disease)    History of blood transfusion    Hypercholesteremia    Osteopenia    Retinal detachment    left    Vitamin D deficiency      Social History:  Social History     Socioeconomic History    Marital status:    Tobacco Use    Smoking status: Never    Smokeless tobacco: Never   Vaping Use    Vaping  status: Never Used   Substance and Sexual Activity    Alcohol use: No    Drug use: No   Other Topics Concern    Caffeine Concern No    Exercise Yes    Seat Belt Yes    Special Diet No    Stress Concern No    Weight Concern No         REVIEW OF SYSTEMS:   GENERAL: Denies fever, chills, or body aches  SKIN: no rashes, no skin wounds or ulcers.  GI: Denies abdominal pain. No N/V/D.   : See HPI.  NEURO: no headaches.    EXAM:   /70   Pulse 72   Temp 98.2 °F (36.8 °C)   Resp 18   Ht 5' 8\" (1.727 m)   Wt 128 lb (58.1 kg)   SpO2 98%   BMI 19.46 kg/m²   GENERAL: well developed, well nourished,in no apparent distress  CARDIO: RRR, no murmurs  LUNGS: clear to ausculation bilaterally, no wheezing or rhonchi  GI: No tenderness or guarding with palpation.No hepatosplenomegaly.  : No suprapubic tenderness. No bladder distention, or CVAT     Recent Results (from the past 24 hour(s))   Urine Dip, auto without Micro    Collection Time: 04/21/24  9:16 AM   Result Value Ref Range    Glucose Urine Negative Negative mg/dL    Bilirubin Urine Negative Negative    Ketones, UA Negative Negative - Trace mg/dL    Spec Gravity 1.020 1.005 - 1.030    Blood Urine Small (A) Negative    PH Urine 6.0 5.0 - 8.0    Protein Urine Trace Negative - Trace mg/dL    Urobilinogen Urine 0.2 0.2 - 1.0 mg/dL    Nitrite Urine Positive (A) Negative    Leukocyte Esterase Urine Small (A) Negative    APPEARANCE cloudy Clear    Color Urine yellow Yellow    Multistix Lot# [id number] Numeric    Multistix Expiration Date 12/31/2024 Date         ASSESSMENT AND PLAN:       ICD-10-CM    1. UTI symptoms  R39.9 Urine Dip, auto without Micro     Urine Culture, Routine     Urine Culture, Routine     amoxicillin clavulanate 875-125 MG Oral Tab          Urine dip: + blood, leuks, and nitrite  Urine culture sent to lab.  Urine hcg: n/a    Discussed HPI and physical exam with pt. Pt has a reassuring physical exam consistent with a lower uti. Treatment options  discussed with patient and explained in detail. Will start augmentin pending urine culture results. The risks, benefits and potential side effects of the medications were reviewed. Alternatives were discussed. Monitoring parameters and expected course outlined. We discussed signs and symptoms that should prompt her to go to the emergency department immediately for evaluation including any fevers, flank pain, abdominal pain, vomiting, vaginal bleeding or if she has any concerns. Patient to call PCP or go to emergency department if symptoms fail to respond as outlined, or worsen in any way. The patient agreed with the plan.       Patient Instructions   1. Rest. Drink plenty of fluids.  2. Augmentin as prescribed.  3. We will send urine culture to lab and call you with results in 3-4 days. At that time we will discuss continuing, stopping, or changing the antibiotic based on the urine culture results.  4. Follow up with PMD in 4-5 days for reeval. Follow up sooner or go to the emergency department immediately if symptoms worsen, change, you develop fevers, flank pain, vomiting, pelvic pain, vaginal discharge/bleeding or if you have any concerns.

## 2024-04-21 NOTE — PATIENT INSTRUCTIONS
1. Rest. Drink plenty of fluids.  2. Augmentin as prescribed.  3. We will send urine culture to lab and call you with results in 3-4 days. At that time we will discuss continuing, stopping, or changing the antibiotic based on the urine culture results.  4. Follow up with PMD in 4-5 days for reeval. Follow up sooner or go to the emergency department immediately if symptoms worsen, change, you develop fevers, flank pain, vomiting, pelvic pain, vaginal discharge/bleeding or if you have any concerns.

## 2024-04-23 ENCOUNTER — TELEPHONE (OUTPATIENT)
Dept: FAMILY MEDICINE CLINIC | Facility: CLINIC | Age: 69
End: 2024-04-23

## 2024-04-23 NOTE — TELEPHONE ENCOUNTER
Spoke with pt.  she was seen on the [facility] on 4/21/24 for UTI sx.    States she was called by them today to discuss results and possibly needing to change the abx, but when she tried calling the ph# that was left on her v/m ([phone]), she couldn't figure out the prompts to be able to speak with a person.     Advised that I will try to reach someone in the [facility] and have them call her again now.

## 2024-04-23 NOTE — TELEPHONE ENCOUNTER
Pt stopped back in office.  She told the PSR that she was told to finish the Augmentin and f/u with PCP.  Pt scheduled an OV with Dr. Magallanes on 4/30.

## 2024-04-23 NOTE — TELEPHONE ENCOUNTER
Pt seen at the walk in clinic in [place] for a UTI   She was prescribed amoxicillin clavulanate 875-125 MG Oral Tab and received a call stating that the bacteria may have some residents

## 2024-04-30 ENCOUNTER — OFFICE VISIT (OUTPATIENT)
Dept: FAMILY MEDICINE CLINIC | Facility: CLINIC | Age: 69
End: 2024-04-30
Payer: MEDICARE

## 2024-04-30 VITALS
TEMPERATURE: 98 F | SYSTOLIC BLOOD PRESSURE: 128 MMHG | DIASTOLIC BLOOD PRESSURE: 76 MMHG | HEART RATE: 70 BPM | RESPIRATION RATE: 18 BRPM | WEIGHT: 130.63 LBS | OXYGEN SATURATION: 98 % | BODY MASS INDEX: 19.8 KG/M2 | HEIGHT: 68 IN

## 2024-04-30 DIAGNOSIS — Z88.9 MULTIPLE DRUG ALLERGIES: ICD-10-CM

## 2024-04-30 DIAGNOSIS — N39.0 E. COLI UTI (URINARY TRACT INFECTION): Primary | ICD-10-CM

## 2024-04-30 DIAGNOSIS — B96.20 E. COLI UTI (URINARY TRACT INFECTION): Primary | ICD-10-CM

## 2024-04-30 PROCEDURE — 99213 OFFICE O/P EST LOW 20 MIN: CPT | Performed by: FAMILY MEDICINE

## 2024-04-30 PROCEDURE — 87086 URINE CULTURE/COLONY COUNT: CPT | Performed by: FAMILY MEDICINE

## 2024-04-30 NOTE — PATIENT INSTRUCTIONS
I reviewed the walk-in clinic records including urine culture results and documented allergies.  Will repeat urine culture to ensure clearance of infection  Given limitation of antibiotic choices, would recommend allergy eval to determine true allergy and possible attempts at desensitization.  Patient given contact information for Dr. Sultana Bacon  Discussed contributing factors for urinary tract infections including holding of urine, voiding after sex, increased daily fluid intake and addition of prophylactic cranberry juice or tablets

## 2024-04-30 NOTE — PROGRESS NOTES
Irma Moya is a 69 year old female.  Chief Complaint   Patient presents with    UTI     Follow up on UTI       HPI:   Patient was seen at the walk-in clinic on 4/21 with urinary complaints.  Her urine culture was positive for E. coli and she was started on Augmentin.  However the culture and sensitivity demonstrated resistance.  However, patient is allergic to sulfa, Cipro, nitrofurantoin and developed elevated liver enzymes on Keflex.  Pt thinks symptoms have resolved,   Current Outpatient Medications   Medication Sig Dispense Refill    Magnesium 500 MG Oral Cap Take 500 mg by mouth As Directed.      rosuvastatin 20 MG Oral Tab Take 1 tablet (20 mg total) by mouth nightly. 90 tablet 3    Probiotic Product ([employer]) Take by mouth.      estradiol 0.1 MG/GM Vaginal Cream Place 0.5 g vaginally twice a week. 1 each 0    clobetasol 0.05 % External Cream Apply 1 Application topically nightly as needed (FOR ANAL ITCHING). 15 g 0    aspirin 81 MG Oral Tab Take 1 tablet (81 mg total) by mouth daily. 1 tablet 0    Melatonin 5 MG Oral Chew Tab Chew 5 mg by mouth nightly.      cetirizine (ZYRTEC) 10 MG Oral Tab Take 1 tablet (10 mg total) by mouth every other day.      Cholecalciferol (VITAMIN D3) 3000 UNITS Oral Tab Take 1 tablet by mouth daily. 1 tablet 0      Past Medical History:    Elevated liver enzymes    resolved Sept, neg w/u including liver biopsy    GERD (gastroesophageal reflux disease)    History of blood transfusion    Hypercholesteremia    Osteopenia    Retinal detachment    left    Vitamin D deficiency      Social History:  Social History     Socioeconomic History    Marital status:    Tobacco Use    Smoking status: Never    Smokeless tobacco: Never   Vaping Use    Vaping status: Never Used   Substance and Sexual Activity    Alcohol use: No    Drug use: No   Other Topics Concern    Caffeine Concern No    Exercise Yes    Seat Belt Yes    Special Diet No    Stress Concern No    Weight  Concern No        REVIEW OF SYSTEMS:   GENERAL HEALTH: feels well otherwise, denies fatigue, appetite ok  SKIN: denies any unusual skin lesions or rashes  ENT: denies nasal congestion, pnd, sore throat, ear pain or pressure, decreased hearing  RESPIRATORY: denies shortness of breath with exertion,cough, wheezing  CARDIOVASCULAR: denies chest pain on exertion, edema  GI: denies abdominal pain and denies heartburn  : symptoms resolved    EXAM:   /76 (BP Location: Left arm, Patient Position: Sitting, Cuff Size: adult)   Pulse 70   Temp 98.4 °F (36.9 °C) (Temporal)   Resp 18   Ht 5' 8\" (1.727 m)   Wt 130 lb 9.6 oz (59.2 kg)   SpO2 98%   BMI 19.86 kg/m²   GENERAL: well developed, well nourished,in no apparent distress, well hydrated  SKIN: no rashes,no suspicious lesions  NECK: supple,no adenopathy,no bruits, no thyromegaly  LUNGS: clear to auscultation, no w/r/r  CARDIO: RRR without murmur, peripheral pulses intact  Office Visit on 04/21/2024   Component Date Value Ref Range Status    Glucose Urine 04/21/2024 Negative  Negative mg/dL Final    Bilirubin Urine 04/21/2024 Negative  Negative Final    Ketones, UA 04/21/2024 Negative  Negative - Trace mg/dL Final    Spec Gravity 04/21/2024 1.020  1.005 - 1.030 Final    Blood Urine 04/21/2024 Small (A)  Negative Final    PH Urine 04/21/2024 6.0  5.0 - 8.0 Final    Protein Urine 04/21/2024 Trace  Negative - Trace mg/dL Final    Urobilinogen Urine 04/21/2024 0.2  0.2 - 1.0 mg/dL Final    Nitrite Urine 04/21/2024 Positive (A)  Negative Final    Leukocyte Esterase Urine 04/21/2024 Small (A)  Negative Final    APPEARANCE 04/21/2024 cloudy  Clear Final    Color Urine 04/21/2024 yellow  Yellow Final    Multistix Lot# 04/21/2024 [id number]  Numeric Final    Multistix Expiration Date 04/21/2024 12/31/2024  Date Final    Urine Culture 04/21/2024 10,000 - 50,000 CFU/ML Escherichia coli (A)   Final       ASSESSMENT AND PLAN:     Encounter Diagnoses   Name Primary?    E. coli  UTI (urinary tract infection) Yes    Multiple drug allergies      Orders Placed This Encounter   Procedures    Urine Culture, Routine [E]     Meds & Refills for this Visit:  Requested Prescriptions      No prescriptions requested or ordered in this encounter     Imaging & Consults:  ALLERGY - INTERNAL  No follow-ups on file.  Patient Instructions   I reviewed the walk-in clinic records including urine culture results and documented allergies.  Will repeat urine culture to ensure clearance of infection  Given limitation of antibiotic choices, would recommend allergy eval to determine true allergy and possible attempts at desensitization.  Patient given contact information for Dr. Sultana Bacon  Discussed contributing factors for urinary tract infections including holding of urine, voiding after sex, increased daily fluid intake and addition of prophylactic cranberry juice or tablets  The patient indicates understanding of these issues and agrees to the plan.    Ck Magallanes DO, FAAFP

## 2024-05-06 ENCOUNTER — TELEPHONE (OUTPATIENT)
Dept: FAMILY MEDICINE CLINIC | Facility: CLINIC | Age: 69
End: 2024-05-06

## 2024-05-06 NOTE — TELEPHONE ENCOUNTER
Pt advised of below.  OV scheduled on 5/7/24    Pt advised if she develops worsening chest pain, numbness/ tinging in either arm, SOB, then to ER for eval.  Pt verbalizes understanding

## 2024-05-06 NOTE — TELEPHONE ENCOUNTER
Spoke with pt. State she had some \"tightness\" in her chest last night, but it went away and she went to bed. Slept well. Reports she felt okay when she woke up this morning.  BP was 132/78, P77.     Pt went to her exercise class this morning , and when driving home, she developed chest tightness and pressure in the center of her chest that lasted 15-20 minutes. States that feeling has resolved, but she still has a \"pressure\" in sternum area.  No SOB, no numbness or tingling in either arm.   Reports /105, P73 right before pt called.

## 2024-05-06 NOTE — TELEPHONE ENCOUNTER
tightness in chest, excersied this am, after having pressure in middle of sternum and slighlty lower 160/105 pulse 73

## 2024-05-06 NOTE — TELEPHONE ENCOUNTER
The fact that she had a CTA last month that showed minimal coronary calcifications, makes a cardiac etiology significantly less likely, may be musculoskeletal in nature.  Please schedule follow-up appointment next available for reevaluation

## 2024-05-07 ENCOUNTER — OFFICE VISIT (OUTPATIENT)
Dept: FAMILY MEDICINE CLINIC | Facility: CLINIC | Age: 69
End: 2024-05-07
Payer: MEDICARE

## 2024-05-07 VITALS
TEMPERATURE: 98 F | RESPIRATION RATE: 18 BRPM | HEIGHT: 68 IN | DIASTOLIC BLOOD PRESSURE: 78 MMHG | HEART RATE: 78 BPM | WEIGHT: 128.63 LBS | SYSTOLIC BLOOD PRESSURE: 158 MMHG | OXYGEN SATURATION: 100 % | BODY MASS INDEX: 19.5 KG/M2

## 2024-05-07 DIAGNOSIS — I25.10 ASHD (ARTERIOSCLEROTIC HEART DISEASE): ICD-10-CM

## 2024-05-07 DIAGNOSIS — E78.00 HYPERCHOLESTEREMIA: ICD-10-CM

## 2024-05-07 DIAGNOSIS — R09.89 LABILE BLOOD PRESSURE: ICD-10-CM

## 2024-05-07 DIAGNOSIS — R07.89 CHEST PRESSURE: Primary | ICD-10-CM

## 2024-05-07 DIAGNOSIS — F43.20 ADULT SITUATIONAL STRESS DISORDER: ICD-10-CM

## 2024-05-07 PROBLEM — N18.30 CKD (CHRONIC KIDNEY DISEASE) STAGE 3, GFR 30-59 ML/MIN (HCC): Chronic | Status: ACTIVE | Noted: 2024-05-07

## 2024-05-07 PROCEDURE — 99214 OFFICE O/P EST MOD 30 MIN: CPT | Performed by: FAMILY MEDICINE

## 2024-05-07 NOTE — PATIENT INSTRUCTIONS
I reviewed goals for blood pressure as well as conservative management of hypertension including sodium restriction, daily aerobic activity, alcohol moderation, smoking cessation, and maintaining ideal body weight.  Would recommend checking echocardiogram, follow-up with cardiology as scheduled  Patient may benefit from the addition of a beta-blocker pending results of echo  Discussed likely significant contribution of stress and anxiety.  Patient instructed in diaphragmatic breathing pattern.  Conservative stress management strategies reviewed.  Follow-up if symptoms worse or not improving

## 2024-05-07 NOTE — PROGRESS NOTES
Irma Moya is a 69 year old female.  Chief Complaint   Patient presents with    Chest Pressure     Patient states pressure started Sunday evening        HPI:   C/o tightness in chest but pt points to upper abd, persistent over last several days  Able to exercise w/o a problem, no problems w/ deep breathing, no GERD, early satiety, no change in BMs patient discomfort, she describes it as a pressure which is not better or worse with food, activity, movement etc.  Heart scan less than 1 year ago with calcium score 113  Patient is CT of the chest on 4/8 that read in part:  CARDIAC:  Mildly enlarged right atrium with reflux of contrast into the IVC and hepatic veins may represent right heart dysfunction.  There appears to be some wall thickening involving the left ventricle.  No pericardial effusion.  Mild coronary artery   calcification.   THORACIC AORTA:  Unremarkable as seen on non-contrast imaging.     Patient has an appointment with cardiology next week  Current Outpatient Medications   Medication Sig Dispense Refill    Magnesium 500 MG Oral Cap Take 500 mg by mouth As Directed.      rosuvastatin 20 MG Oral Tab Take 1 tablet (20 mg total) by mouth nightly. 90 tablet 3    Probiotic Product ([employer]) Take by mouth.      estradiol 0.1 MG/GM Vaginal Cream Place 0.5 g vaginally twice a week. 1 each 0    clobetasol 0.05 % External Cream Apply 1 Application topically nightly as needed (FOR ANAL ITCHING). 15 g 0    aspirin 81 MG Oral Tab Take 1 tablet (81 mg total) by mouth daily. 1 tablet 0    Melatonin 5 MG Oral Chew Tab Chew 5 mg by mouth nightly.      cetirizine (ZYRTEC) 10 MG Oral Tab Take 1 tablet (10 mg total) by mouth every other day.      Cholecalciferol (VITAMIN D3) 3000 UNITS Oral Tab Take 1 tablet by mouth daily. 1 tablet 0      Past Medical History:    Elevated liver enzymes    resolved Sept, neg w/u including liver biopsy    GERD (gastroesophageal reflux disease)    History of blood  transfusion    Hypercholesteremia    Osteopenia    Retinal detachment    left    Vitamin D deficiency      Social History:  Social History     Socioeconomic History    Marital status:    Tobacco Use    Smoking status: Never    Smokeless tobacco: Never   Vaping Use    Vaping status: Never Used   Substance and Sexual Activity    Alcohol use: No    Drug use: No   Other Topics Concern    Caffeine Concern No    Exercise Yes    Seat Belt Yes    Special Diet No    Stress Concern No    Weight Concern No        REVIEW OF SYSTEMS:   GENERAL HEALTH: feels well otherwise, appetite ok, +tired a lot, weight has been stable  SKIN: denies any unusual skin lesions or rashes  ENT: denies nasal congestion, pnd, sore throat, ear pain or pressure, decreased hearing  RESPIRATORY: denies shortness of breath with exertion,cough, wheezing  CARDIOVASCULAR: denies chest pain on exertion, edema, bp all over the place /,see hpi  GI: denies abdominal pain and denies heartburn  NEURO: denies headaches  PSYCH: lots of stress, 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  Patient is having difficulty maintaining the accounting which causes a significant amount of stress, delayed sleep onset, restful sleep when she gets to sleep    EXAM:   /78   Pulse 78   Temp 98.4 °F (36.9 °C) (Temporal)   Resp 18   Ht 5' 8\" (1.727 m)   Wt 128 lb 9.6 oz (58.3 kg)   SpO2 100%   BMI 19.55 kg/m²   GENERAL: well developed, well nourished,in no apparent distress, well hydrated, thin female  SKIN: no rashes,no suspicious lesions  ENT: TMs: intact, good mobility, Nose: turbinates clear, no dc, Throat: no erythema, pnd, or lesions  NECK: supple,no adenopathy,no bruits, no thyromegaly  LUNGS: clear to auscultation, no w/r/r  CARDIO: RRR without murmur, occasional premature beats, peripheral pulses intact, no edema  GI: good BS's,no masses, HSM or tenderness, no bruits, no pulsatile  masses  EXTREMITIES: FROM of all joints  Psych: Neat appearance, good insight and judgment, slightly anxious affect  ASSESSMENT AND PLAN:     Encounter Diagnoses   Name Primary?    Chest pressure Yes    Labile blood pressure     Adult situational stress disorder     ASHD (arteriosclerotic heart disease)-calcium score 55 on heart scan February 2020     Hypercholesteremia      No orders of the defined types were placed in this encounter.    Meds & Refills for this Visit:  Requested Prescriptions      No prescriptions requested or ordered in this encounter     Imaging & Consults:  CARD ECHO 2D DOPPLER (CPT=93306)  No follow-ups on file.  Patient Instructions   I reviewed goals for blood pressure as well as conservative management of hypertension including sodium restriction, daily aerobic activity, alcohol moderation, smoking cessation, and maintaining ideal body weight.  Would recommend checking echocardiogram, follow-up with cardiology as scheduled  Patient may benefit from the addition of a beta-blocker pending results of echo  Discussed likely significant contribution of stress and anxiety.  Patient instructed in diaphragmatic breathing pattern.  Conservative stress management strategies reviewed.  Follow-up if symptoms worse or not improving  The patient indicates understanding of these issues and agrees to the plan.    Ck Magallanes DO, FAAFP

## 2024-05-15 ENCOUNTER — HOSPITAL ENCOUNTER (OUTPATIENT)
Dept: CV DIAGNOSTICS | Age: 69
Discharge: HOME | End: 2024-05-15
Attending: FAMILY MEDICINE

## 2024-05-15 DIAGNOSIS — R07.89 CHEST PRESSURE: ICD-10-CM

## 2024-05-15 PROCEDURE — 93306 TTE W/DOPPLER COMPLETE: CPT | Performed by: FAMILY MEDICINE

## 2024-06-04 ENCOUNTER — TELEPHONE (OUTPATIENT)
Dept: FAMILY MEDICINE CLINIC | Facility: CLINIC | Age: 69
End: 2024-06-04

## 2024-06-06 NOTE — TELEPHONE ENCOUNTER
From: Humphrey Morejon  To: Renan Rajput., DO  Sent: 5/25/2017 7:41 AM CDT  Subject: Test Results Question    Dr Peace Last wanted me to have a CMP test taken on four consecutive Wednesdays.  I have had two and the liver enzymes have been down so that is good
Please advise patient that in the setting of not taking any medications that may cause an elevated potassium which is her case. It may be due to the use of salt substitutes which are potassium chloride.   Inadequate hydration prior to the blood draw or a d
Regarding: Test Results Question  Contact: [phone]  ----- Message from Sherri Bang RN sent at 5/25/2017  8:47 AM CDT -----       ----- Message from Elaine Diaz to Becky Nicolas DO sent at 5/25/2017  7:41 AM -----   Dr Jono Solomon wanted me to hav
stable

## 2024-06-10 ENCOUNTER — PATIENT MESSAGE (OUTPATIENT)
Dept: FAMILY MEDICINE CLINIC | Facility: CLINIC | Age: 69
End: 2024-06-10

## 2024-06-10 NOTE — TELEPHONE ENCOUNTER
From: Irma Moya  To: Ck Magallanes  Sent: 6/10/2024 12:42 PM CDT  Subject: Cardiology and allergy visits    Dr ECKERT,  Has either Dr Valles or Dr Bacon been in contact with you? I know Dr Bacon is not on [facility], but I assumed Dr Valles was, but I wondered if either has contacted you about my results.   I got a run down from them, but I was hoping they would eventually make it into [facility]  Irma Moya

## 2024-06-21 RX ORDER — ROSUVASTATIN CALCIUM 20 MG/1
20 TABLET, COATED ORAL NIGHTLY
Qty: 90 TABLET | Refills: 3 | Status: SHIPPED | OUTPATIENT
Start: 2024-06-21

## 2024-06-21 NOTE — TELEPHONE ENCOUNTER
Cholesterol Medication Protocol [id number]/21/2024 01:54 PM   Protocol Details ALT < 80    ALT resulted within past year    Lipid panel within past 12 months    In person appointment or virtual visit in the past 12 mos or appointment in next 3 mos

## 2024-10-07 ENCOUNTER — TELEPHONE (OUTPATIENT)
Dept: FAMILY MEDICINE CLINIC | Facility: CLINIC | Age: 69
End: 2024-10-07

## 2024-10-07 ENCOUNTER — OFFICE VISIT (OUTPATIENT)
Dept: FAMILY MEDICINE CLINIC | Facility: CLINIC | Age: 69
End: 2024-10-07
Payer: MEDICARE

## 2024-10-07 VITALS
OXYGEN SATURATION: 98 % | SYSTOLIC BLOOD PRESSURE: 152 MMHG | DIASTOLIC BLOOD PRESSURE: 82 MMHG | RESPIRATION RATE: 16 BRPM | HEIGHT: 68 IN | HEART RATE: 50 BPM | BODY MASS INDEX: 19.7 KG/M2 | TEMPERATURE: 98 F | WEIGHT: 130 LBS

## 2024-10-07 DIAGNOSIS — I49.3 PREMATURE VENTRICULAR CONTRACTION: ICD-10-CM

## 2024-10-07 DIAGNOSIS — I49.9 IRREGULAR HEART RHYTHM: Primary | ICD-10-CM

## 2024-10-07 DIAGNOSIS — R42 ORTHOSTATIC LIGHTHEADEDNESS: ICD-10-CM

## 2024-10-07 DIAGNOSIS — I25.10 ASHD (ARTERIOSCLEROTIC HEART DISEASE): ICD-10-CM

## 2024-10-07 DIAGNOSIS — R09.89 LABILE BLOOD PRESSURE: ICD-10-CM

## 2024-10-07 LAB
ATRIAL RATE: 83 BPM
P AXIS: 60 DEGREES
P-R INTERVAL: 172 MS
Q-T INTERVAL: 392 MS
QRS DURATION: 72 MS
QTC CALCULATION (BEZET): 460 MS
R AXIS: -16 DEGREES
T AXIS: 68 DEGREES
VENTRICULAR RATE: 83 BPM

## 2024-10-07 RX ORDER — EPINEPHRINE 0.3 MG/.3ML
1 INJECTION SUBCUTANEOUS DAILY
COMMUNITY
Start: 2024-05-31

## 2024-10-07 NOTE — PROGRESS NOTES
I have personally reviewed the history and exam components associated with this encounter performed by NP student, Amna Gabriel RN.  I agree with the diagnosis and treatment plan.    Ck Magallanes DO, FAAFP

## 2024-10-07 NOTE — PATIENT INSTRUCTIONS
1. Irregular heart rhythm  Discussed likely contributing factors  Advised to contact scheduling to get a stress test  Contact cardiology office if needing to be seen sooner  - EKG with interpretation and Report -IN OFFICE [[id number]]  - CARD NUC EXERCISE STRESS TEST (CPT 96898/48610); Future    2. Orthostatic lightheadedness  Discussed likely contributing factors, discussed risks and benefits of medications.   Advised to continue BP monitoring at home  Advised to change positions slowly, increase salt intake when feeling lightheaded or dizzy.     3. Premature ventricular contraction  Discussed likely contributing factors  Advised patient to schedule stress test and contact cardiology.     4. Labile blood pressure  Discussed likely contributing factors  Advised patient to continue to monitor BP at home

## 2024-10-07 NOTE — TELEPHONE ENCOUNTER
Patient scheduled on MyChart for blood pressure and feeling faint.  Scheduled with  10/7/24 at 1:45pm.

## 2024-10-07 NOTE — PROGRESS NOTES
Irma Moya is a 69 year old female.  HPI:   Patient presents to the clinic with complaints of episodes of hypotension and feelings of dizziness. Symptoms started last Sunday (9/29) with fatigue, lightheaded, dizzy, felt like she needed to sit down, however did not feel like she was going to pass out. When she got home she checked her BP and got 92/58 . She denies any change in diet or exercising. Sunday (10/6) morning she was feeling lightheaded/dizzy. Denies changes in appetite. Recently changed her probiotic from [employer] to [employer] brand 2 weeks ago, other than that no other medication changes. Denies chest pain, heart palpitations, she has been getting headaches to the right side above her eye it does resolves on its own, does not last that long. No change in sleeping habits. Drinking fluids fine, urinating fine. She stated that for as long as she can remember she gets lightheaded and dizzy with position changes. She makes sure to sit up slowly, stand slowly. Did go to exercise class today and felt fine. Denies SOB, chest pain, palpitations with exercise. Denies any neck or arm pain. However she has noticed going up the stairs she feels \"warm out\". She stated she seen cardiology recently and they said her EKG was fine.   Last weeks readings were average 100-120's/65-70's.   Today this morning 120-130/ 70's. She checks her BP different times of the day. In office her machine BP: 164/128 HR: 81 Manual in office BP:142/78     Current Outpatient Medications   Medication Sig Dispense Refill    rosuvastatin 20 MG Oral Tab Take 1 tablet (20 mg total) by mouth nightly. 90 tablet 3    Magnesium 500 MG Oral Cap Take 500 mg by mouth As Directed.      Probiotic Product ([employer] OR) Take by mouth.      estradiol 0.1 MG/GM Vaginal Cream Place 0.5 g vaginally twice a week. 1 each 0    aspirin 81 MG Oral Tab Take 1 tablet (81 mg total) by mouth daily. 1 tablet 0    Melatonin 5 MG Oral Chew Tab Chew 5 mg  by mouth nightly.      cetirizine (ZYRTEC) 10 MG Oral Tab Take 1 tablet (10 mg total) by mouth every other day.      Cholecalciferol (VITAMIN D3) 3000 UNITS Oral Tab Take 1 tablet by mouth daily. 1 tablet 0    EPINEPHrine 0.3 MG/0.3ML Injection Solution Auto-injector Inject 1 mL (3.3333 each total) into the muscle daily. (Patient not taking: Reported on 10/7/2024)      clobetasol 0.05 % External Cream Apply 1 Application topically nightly as needed (FOR ANAL ITCHING). (Patient not taking: Reported on 10/7/2024) 15 g 0      Past Medical History:    Elevated liver enzymes    resolved Sept, neg w/u including liver biopsy    GERD (gastroesophageal reflux disease)    History of blood transfusion    Hypercholesteremia    Osteopenia    Retinal detachment    left    Vitamin D deficiency      Social History:  Social History     Socioeconomic History    Marital status:    Tobacco Use    Smoking status: Never    Smokeless tobacco: Never   Vaping Use    Vaping status: Never Used   Substance and Sexual Activity    Alcohol use: No    Drug use: No   Other Topics Concern    Caffeine Concern No    Exercise Yes    Seat Belt Yes    Special Diet No    Stress Concern No    Weight Concern No        REVIEW OF SYSTEMS:   GENERAL HEALTH: feels well otherwise, increased fatigue, appetite ok, weight stable  SKIN: denies any unusual skin lesions or rashes  ENT: denies nasal congestion, pnd, sore throat, ear pain or pressure, decreased hearing. Denies any recent illness, denies snoring or periods of apnea  RESPIRATORY: positive for shortness of breath with exertion, denies cough, wheezing  CARDIOVASCULAR: denies chest pain on exertion, edema, see hpi  GI: denies abdominal pain and denies heartburn.  NEURO: headaches see hpi  PSYCH: denies feeling stressed or anxious. Pt has been very busy lately with increased stress. Denies any sleeping difficulties states it takes about 2 hours to fall asleep but then she is able to sleep through the  night.     EXAM:   /82 (BP Location: Left arm, Patient Position: Sitting, Cuff Size: adult)   Pulse 50   Temp 98.1 °F (36.7 °C) (Temporal)   Resp 16   Ht 5' 8\" (1.727 m)   Wt 130 lb (59 kg)   SpO2 98%   BMI 19.77 kg/m²   GENERAL: well developed, well nourished,in no apparent distress, well hydrated, thin   SKIN: no rashes,no suspicious lesions  ENT: TMs: intact, good mobility, Nose: turbinates clear, no dc, Throat: no erythema, pnd, or lesions  NECK: supple,no adenopathy,no bruits, no thyromegaly  LUNGS: clear to auscultation, no w/r/r  CARDIO: RRR without murmur, peripheral pulses intact, no edema. Occasional premature beats noted  GI: good BS's,no masses, HSM or tenderness  EXTREMITIES: FROM of all joints.  Psych: Neat appearance, good insight and judgment, slightly anxious affect   EKG: Normal sinus rhythm with frequent PVCs, heart rate 83, GA interval 0.17, QRS interval 0.07, QRS axis -16 degrees, otherwise normal EKG, no ischemic changes  ASSESSMENT AND PLAN:   1. Irregular heart rhythm  Discussed likely contributing factors  Will schedule nuclear stress test  Contact cardiology office   - EKG with interpretation and Report -IN OFFICE [[id number]]  - CARD NUC EXERCISE STRESS TEST (CPT 10902/37310); Future    2. Orthostatic lightheadedness  Discussed likely contributing factors, discussed risks and benefits of medications.   Advised to continue BP monitoring at home  Advised to change positions slowly, increase daily salt intake     3. Premature ventricular contraction  Discussed likely contributing factors  Advised patient to schedule stress test and contact cardiology.     4. Labile blood pressure  Discussed likely contributing factors  Advised patient to continue to monitor BP at home, defer meds for now      The patient indicates understanding of these issues and agrees to the plan.   Amna Gabriel RN, APRN student     Ck Magallanes DO, FAAFP

## 2024-10-07 NOTE — TELEPHONE ENCOUNTER
Spoke with pt re: below.  States her BP has been running high and low for 1 week.  She will bring her home monitor to  today to compare to ours.  Pt states she feels safe driving here today and feels she can wait until her appt time

## 2024-10-09 ENCOUNTER — PATIENT MESSAGE (OUTPATIENT)
Dept: FAMILY MEDICINE CLINIC | Facility: CLINIC | Age: 69
End: 2024-10-09

## 2024-10-15 ENCOUNTER — HOSPITAL ENCOUNTER (OUTPATIENT)
Dept: CV DIAGNOSTICS | Facility: HOSPITAL | Age: 69
Discharge: HOME | End: 2024-10-15
Attending: FAMILY MEDICINE
Payer: MEDICARE

## 2024-10-15 DIAGNOSIS — I49.9 IRREGULAR HEART RHYTHM: ICD-10-CM

## 2024-10-15 PROCEDURE — 93018 CV STRESS TEST I&R ONLY: CPT | Performed by: FAMILY MEDICINE

## 2024-10-15 PROCEDURE — 78452 HT MUSCLE IMAGE SPECT MULT: CPT | Performed by: FAMILY MEDICINE

## 2024-10-15 PROCEDURE — 93017 CV STRESS TEST TRACING ONLY: CPT | Performed by: FAMILY MEDICINE

## 2024-11-21 NOTE — TELEPHONE ENCOUNTER
Pt is due for a bone density test, where should she go? S/p EGD and stenting November 19  Will advance diet per GI recommendations  Continue PPI

## 2024-12-04 ENCOUNTER — PATIENT MESSAGE (OUTPATIENT)
Dept: FAMILY MEDICINE CLINIC | Facility: CLINIC | Age: 69
End: 2024-12-04

## 2025-01-08 ENCOUNTER — OFFICE VISIT (OUTPATIENT)
Dept: FAMILY MEDICINE CLINIC | Facility: CLINIC | Age: 70
End: 2025-01-08
Payer: MEDICARE

## 2025-01-08 VITALS
WEIGHT: 129 LBS | OXYGEN SATURATION: 98 % | SYSTOLIC BLOOD PRESSURE: 132 MMHG | RESPIRATION RATE: 16 BRPM | HEART RATE: 72 BPM | TEMPERATURE: 98 F | BODY MASS INDEX: 19.78 KG/M2 | HEIGHT: 67.52 IN | DIASTOLIC BLOOD PRESSURE: 80 MMHG

## 2025-01-08 DIAGNOSIS — Z00.00 ENCOUNTER FOR ANNUAL HEALTH EXAMINATION: Primary | ICD-10-CM

## 2025-01-08 DIAGNOSIS — M85.852 OSTEOPENIA OF LEFT HIP: ICD-10-CM

## 2025-01-08 DIAGNOSIS — I25.10 ASHD (ARTERIOSCLEROTIC HEART DISEASE): ICD-10-CM

## 2025-01-08 DIAGNOSIS — Z12.31 VISIT FOR SCREENING MAMMOGRAM: ICD-10-CM

## 2025-01-08 DIAGNOSIS — K58.2 IRRITABLE BOWEL SYNDROME WITH BOTH CONSTIPATION AND DIARRHEA: ICD-10-CM

## 2025-01-08 DIAGNOSIS — N18.31 CHRONIC KIDNEY DISEASE (CKD) STAGE G3A/A1, MODERATELY DECREASED GLOMERULAR FILTRATION RATE (GFR) BETWEEN 45-59 ML/MIN/1.73 SQUARE METER AND ALBUMINURIA CREATININE RATIO LESS THAN 30 MG/G (HCC): ICD-10-CM

## 2025-01-08 DIAGNOSIS — Z86.69 HISTORY OF RETINAL DETACHMENT: ICD-10-CM

## 2025-01-08 DIAGNOSIS — E78.00 HYPERCHOLESTEREMIA: ICD-10-CM

## 2025-01-08 NOTE — H&P
HPI:   Irma Moya is a 69 year old female   Chief Complaint   Patient presents with    Physical     MA super visit  Reviewed Preventative/Wellness form with patient.      Lipids   Gag reflex is worse    Wt Readings from Last 6 Encounters:   01/08/25 129 lb (58.5 kg)   10/07/24 130 lb (59 kg)   05/07/24 128 lb 9.6 oz (58.3 kg)   04/30/24 130 lb 9.6 oz (59.2 kg)   04/21/24 128 lb (58.1 kg)   03/21/24 132 lb (59.9 kg)     Body mass index is 19.89 kg/m².     Cholesterol, Total (mg/dL)   Date Value   03/22/2024 176   10/27/2022 195   04/26/2022 191     Total Cholesterol (POC) (mg/dl)   Date Value   06/14/2023 187     HDL Cholesterol (mg/dL)   Date Value   03/22/2024 76 (H)   10/27/2022 74 (H)   04/26/2022 74 (H)     HDL (POC) (mg/dl)   Date Value   06/14/2023 74 (A)     LDL Cholesterol (mg/dL)   Date Value   03/22/2024 84   10/27/2022 102 (H)   04/26/2022 100 (H)     LDL (POC) (mg/dl)   Date Value   06/14/2023 92     AST (U/L)   Date Value   03/22/2024 39 (H)   11/08/2023 15   04/26/2022 13 (L)     ALT (U/L)   Date Value   03/22/2024 50   11/08/2023 17   04/26/2022 20        Current Outpatient Medications   Medication Sig Dispense Refill    EPINEPHrine 0.3 MG/0.3ML Injection Solution Auto-injector Inject 1 mL (3.3333 each total) into the muscle daily.      rosuvastatin 20 MG Oral Tab Take 1 tablet (20 mg total) by mouth nightly. 90 tablet 3    Magnesium 500 MG Oral Cap Take 500 mg by mouth As Directed.      Probiotic Product ([employer] OR) Take by mouth.      estradiol 0.1 MG/GM Vaginal Cream Place 0.5 g vaginally twice a week. 1 each 0    aspirin 81 MG Oral Tab Take 1 tablet (81 mg total) by mouth daily. 1 tablet 0    Melatonin 5 MG Oral Chew Tab Chew 5 mg by mouth nightly.      cetirizine (ZYRTEC) 10 MG Oral Tab Take 1 tablet (10 mg total) by mouth every other day.      Cholecalciferol (VITAMIN D3) 3000 UNITS Oral Tab Take 1 tablet by mouth daily. 1 tablet 0    clobetasol 0.05 % External Cream Apply 1  Application topically nightly as needed (FOR ANAL ITCHING). 15 g 0     Allergies[1]     Past Medical History:    Elevated liver enzymes    resolved Sept, neg w/u including liver biopsy    GERD (gastroesophageal reflux disease)    History of blood transfusion    Hypercholesteremia    Osteopenia    Retinal detachment    left    Vitamin D deficiency      Past Surgical History:   Procedure Laterality Date    Colonoscopy  03/02/2011    normal    Colonoscopy  02/01/2021    normal    D & c      Dexa, axial site (spine, hips, pelvis)  09/22/2017    T-score -2.3 total hip, -1.9 FN    Dexa, axial site (spine, hips, pelvis)  09/20/2019    T score is -2.4 at femoral neck    Dxa bone density study axial skeleton  04/05/2022    T score -1.5 in the lumbar spine, -2.3 left femoral neck    Echo 2d dp/clrfl, cardio (dmg)  05/15/2024    Ejection fraction 60 to 65%, normal diastolic function, mild MR    Lasik  1998    Needle biopsy liver  08/09/2017    Nuc stress test, cardio (dmg)  10/15/2024    Negative    Other surgical history  07/28/2017    liver biopsy    Other surgical history  02/28/2020    retina repair on both eyes    Stress echo (exercise)  09/19/2020    negative    Upper gi endoscopy - referral  01/03/2014    NEG      Family History   Problem Relation Age of Onset    Asthma Son     Heart Disorder Son         a-fib    Cancer Father         COLON in his 80s, PROSTATE    Heart Disorder Father     Heart Disorder Mother     Diabetes Mother     Obesity Mother     Heart Disorder Sister     Obesity Sister     Other (MULTIPLE SCLEROSIS) Brother     Cancer Sister         MYELOMA    Arthritis Sister         RHEUMATOID ATHRITIS    Heart Disorder Brother         MI    Cancer Brother         bladder    Cancer Brother 62        colorectal cancer        Social History     Socioeconomic History    Marital status:    Tobacco Use    Smoking status: Never     Passive exposure: Never    Smokeless tobacco: Never   Vaping Use    Vaping  status: Never Used   Substance and Sexual Activity    Alcohol use: Not Currently    Drug use: No   Other Topics Concern    Caffeine Concern No    Exercise Yes    Seat Belt Yes    Special Diet No    Stress Concern No    Weight Concern No     Specialists: Dr Earl- urogyne, Dr Ford, Dr Ríos- [facility], Dr Castillo-cardiology eval 23   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 : +. Children: 3.   Exercise: Walks 2 miles 5 times per week, yoga off and on.  Diet: generally healthy  Gyne Hx: LMP:  na,  G 3, P 3003,  Last Mammogram:  6/3/24, pap 2018     REVIEW OF SYSTEMS:   GENERAL: generally feels well, no unusual fatigue,no excessive daytime drowsiness, good appetite, weight has been stable  SKIN:  see hpi  EYES:denies blurred vision or double vision, glasses/ contacts: +, last eye exam : 3/8/24 @ [facility] for retinal eval  HEENT: denies nasal congestion, pnd, or ST, denies snoring and reported periods of apnea, denies hearing deficits  LUNGS: denies shortness of breath with exertion, no coughing or wheezing  CARDIOVASCULAR: denies chest pain on exertion, palpations, anginal equivalent symptoms, no claudication  GI: denies abdominal pain,denies heartburn, patient no change in BMs loose alternating with harder stools, improved on daily fiber supplement  : denies dysuria, vaginal discharge or itching, no uterine bleeding, + less stess incontinence ,some overflow incontinence, using topical ERT 2 x weekly, doing pelvic floor HEP  MUSCULOSKELETAL: denies back or joint pain-back pain controlled w/ HEP  NEURO: denies tremors, dizziness, numbness, tingling, muscular weakness, patient notes occasional fleeting headaches, usually in the temporal regions  PSYCHE: denies depression or anxiety, denies any sleep difficulty, increased stressors over past several weeks , brother  several weeks ago    Have you often been bothered by feeling down, depressed of hopeless? no                  Have you often been bothered by  little interest or pleasure in doing things? no  HEMATOLOGIC: denies unexplained bruising or bleeding  ENDOCRINE: denies heat or cold intolerance , no unexplained wt loss or gain  FUNCTIONAL ABILITY: Do you need help with preparing meals? no, Dressing? no, Hygiene? no, Using the bathroom? no, Transportation? no, Shopping? no, Taking medications? no, Banking? no  SAFETY: Does your home have throw rugs? no, Adequate lighting? yes, Grab bars in bathroom/shower/tub? yes,  Handrails on stairs? yes, Some alarms? yes   Get and Go test > 12 seconds?  no  EXAM:   /80 (BP Location: Left arm, Patient Position: Sitting, Cuff Size: adult)   Pulse 72   Temp 97.7 °F (36.5 °C) (Temporal)   Resp 16   Ht 5' 7.52\" (1.715 m)   Wt 129 lb (58.5 kg)   SpO2 98%   BMI 19.89 kg/m²   Body mass index is 19.89 kg/m².   GENERAL: well developed, well nourished,in no apparent distress  SKIN: no rashes,no suspicious lesions, 2 fleshy epidermal nevi on back, + scattered superficial telangiectasias across the upper back and shoulder  HEENT: Ears:  TMs intact, canals clear,  Nose: turbinates clear,Septum midline, Pharynx: no pnd, erythema, or airway obstruction  EYES:PERRLA, EOMI, Fundi: benign,conjunctiva are clear  NECK: supple,no adenopathy,no bruits, no thyromegaly or palpable nodules  BREASTS: Nipples are everted without discharge, no palpable masses, no dimpling or retractions, no axillary supraclavicular lymphadenopathy palpable  LUNGS: clear to auscultation, no w/r/r  CARDIO: RRR without murmur, strong peripheral pulses, no peripheral edema  GI: good BS's,no masses, HSM , nontender  MUSCULOSKELETAL: back is non-tender,FROM of the back, flexion: fingers to floor  EXTREMITIES: FROM of all joints tested,  no cyanosis, clubbing or edema, no varicosities, bilateral upper extremities: Full range of all joints tested  NEURO: Oriented times three,CN 2-12 are intact,motor and sensory are grossly intact, Gait: normal, DTRs +2/4 bilaterally,  Monofilament testing intact on plantar aspect of feet  PSYCH:  Speech clear and coherent, judgement: good, appearance: neat, Affect:slightly anxious  ASSESSMENT AND PLAN:   Irma Moya is a 69 year old female who  Encounter Diagnoses   Name Primary?    Encounter for annual health examination Yes    Visit for screening mammogram     ASHD (arteriosclerotic heart disease)-calcium score 113 on heart scan 6/14/23     Hypercholesteremia     History of retinal detachment- left     Chronic kidney disease (CKD) stage G3a/A1, moderately decreased glomerular filtration rate (GFR) between 45-59 mL/min/1.73 square meter and albuminuria creatinine ratio less than 30 mg/g (HCC)     Osteopenia of left hip     Irritable bowel syndrome with both constipation and diarrhea      Orders Placed This Encounter   Procedures    Basic Metabolic Panel (8) [E]    Lipid Panel [E]     Meds & Refills for this Visit:  Requested Prescriptions      No prescriptions requested or ordered in this encounter     Imaging & Consults:  [facility] YAMILA 2D+3D SCREENING BILAT (CPT=77067/68777)  No follow-ups on file.  Patient Instructions   1. Encounter for annual health examination  I reviewed age-appropriate preventive health screen exams as well as advanced directives and immunizations    2. Visit for screening mammogram  Continue monthly self breast exams and annual mammograms, patient will be to in June  - 3D Mammogram Digital Screen, Bilateral (CPT=77067/33516); Future    3. ASHD (arteriosclerotic heart disease)-calcium score 113 on heart scan 6/14/23  Monitor clinically, continue statin therapy    4. Hypercholesteremia  Reviewed goals for lipids.  Continue statin therapy, check labs annually  - Lipid Panel [E]; Future    5. History of retinal detachment- left  Follow-up with ophthalmology as needed    6. Chronic kidney disease (CKD) stage G3a/A1, moderately decreased glomerular filtration rate (GFR) between 45-59 mL/min/1.73 square meter and albuminuria  creatinine ratio less than 30 mg/g (HCC)  Discussed importance of adequate daily fluid intake as well as avoiding potential nephrotoxic drugs such as frequent NSAID use  - Basic Metabolic Panel (8) [E]; Future    7. Osteopenia of left hip  Continue daily exercise regimen, vitamin D supplement    8. Irritable bowel syndrome with both constipation and diarrhea  Discussed contributing factors.  Continue fiber supplement, monitor clinically    Ck Magallanes DO, FAAFP           [1]   Allergies  Allergen Reactions    Ciprofloxacin HIVES    Keflex [Cephalexin] OTHER (SEE COMMENTS)     Possible association with elevated LFTs    Macrobid [Nitrofurantoin] ITCHING     Delayed urticarial rash several days after completing a course of medication twice

## 2025-01-08 NOTE — PATIENT INSTRUCTIONS
1. Encounter for annual health examination  I reviewed age-appropriate preventive health screen exams as well as advanced directives and immunizations    2. Visit for screening mammogram  Continue monthly self breast exams and annual mammograms, patient will be to in June  - 3D Mammogram Digital Screen, Bilateral (CPT=77067/68036); Future    3. ASHD (arteriosclerotic heart disease)-calcium score 113 on heart scan 6/14/23  Monitor clinically, continue statin therapy    4. Hypercholesteremia  Reviewed goals for lipids.  Continue statin therapy, check labs annually  - Lipid Panel [E]; Future    5. History of retinal detachment- left  Follow-up with ophthalmology as needed    6. Chronic kidney disease (CKD) stage G3a/A1, moderately decreased glomerular filtration rate (GFR) between 45-59 mL/min/1.73 square meter and albuminuria creatinine ratio less than 30 mg/g (HCC)  Discussed importance of adequate daily fluid intake as well as avoiding potential nephrotoxic drugs such as frequent NSAID use  - Basic Metabolic Panel (8) [E]; Future    7. Osteopenia of left hip  Continue daily exercise regimen, vitamin D supplement    8. Irritable bowel syndrome with both constipation and diarrhea  Discussed contributing factors.  Continue fiber supplement, monitor clinically

## 2025-01-10 ENCOUNTER — LAB ENCOUNTER (OUTPATIENT)
Dept: LAB | Age: 70
End: 2025-01-10
Attending: FAMILY MEDICINE
Payer: MEDICARE

## 2025-01-10 DIAGNOSIS — E78.00 HYPERCHOLESTEREMIA: ICD-10-CM

## 2025-01-10 DIAGNOSIS — N18.31 CHRONIC KIDNEY DISEASE (CKD) STAGE G3A/A1, MODERATELY DECREASED GLOMERULAR FILTRATION RATE (GFR) BETWEEN 45-59 ML/MIN/1.73 SQUARE METER AND ALBUMINURIA CREATININE RATIO LESS THAN 30 MG/G (HCC): ICD-10-CM

## 2025-01-10 LAB
ANION GAP SERPL CALC-SCNC: 5 MMOL/L (ref 0–18)
BUN BLD-MCNC: 10 MG/DL (ref 9–23)
CALCIUM BLD-MCNC: 9.8 MG/DL (ref 8.7–10.4)
CHLORIDE SERPL-SCNC: 101 MMOL/L (ref 98–112)
CHOLEST SERPL-MCNC: 185 MG/DL (ref ?–200)
CO2 SERPL-SCNC: 28 MMOL/L (ref 21–32)
CREAT BLD-MCNC: 1.07 MG/DL
EGFRCR SERPLBLD CKD-EPI 2021: 56 ML/MIN/1.73M2 (ref 60–?)
FASTING PATIENT LIPID ANSWER: YES
FASTING STATUS PATIENT QL REPORTED: YES
GLUCOSE BLD-MCNC: 105 MG/DL (ref 70–99)
HDLC SERPL-MCNC: 79 MG/DL (ref 40–59)
LDLC SERPL CALC-MCNC: 89 MG/DL (ref ?–100)
NONHDLC SERPL-MCNC: 106 MG/DL (ref ?–130)
OSMOLALITY SERPL CALC.SUM OF ELEC: 277 MOSM/KG (ref 275–295)
POTASSIUM SERPL-SCNC: 4.4 MMOL/L (ref 3.5–5.1)
SODIUM SERPL-SCNC: 134 MMOL/L (ref 136–145)
TRIGL SERPL-MCNC: 93 MG/DL (ref 30–149)
VLDLC SERPL CALC-MCNC: 15 MG/DL (ref 0–30)

## 2025-01-10 PROCEDURE — 80061 LIPID PANEL: CPT

## 2025-01-10 PROCEDURE — 36415 COLL VENOUS BLD VENIPUNCTURE: CPT

## 2025-01-10 PROCEDURE — 80048 BASIC METABOLIC PNL TOTAL CA: CPT

## 2025-01-13 ENCOUNTER — PATIENT MESSAGE (OUTPATIENT)
Dept: FAMILY MEDICINE CLINIC | Facility: CLINIC | Age: 70
End: 2025-01-13

## 2025-01-13 NOTE — TELEPHONE ENCOUNTER
Chauncey Solis,    Slight variations are normal. Results could fluctuate based on diet and how long you were fasting for the labs. When compared to your last labs in March, there are no significant changes. Continue healthy diet and exercise, as well as all medications as directed.    Thank you  Claire CESPEDES

## 2025-02-08 ENCOUNTER — APPOINTMENT (OUTPATIENT)
Dept: CT IMAGING | Facility: HOSPITAL | Age: 70
End: 2025-02-08
Attending: EMERGENCY MEDICINE
Payer: MEDICARE

## 2025-02-08 ENCOUNTER — OFFICE VISIT (OUTPATIENT)
Dept: FAMILY MEDICINE CLINIC | Facility: CLINIC | Age: 70
End: 2025-02-08
Payer: MEDICARE

## 2025-02-08 ENCOUNTER — HOSPITAL ENCOUNTER (OUTPATIENT)
Facility: HOSPITAL | Age: 70
Setting detail: OBSERVATION
Discharge: HOME | End: 2025-02-09
Attending: EMERGENCY MEDICINE | Admitting: HOSPITALIST
Payer: MEDICARE

## 2025-02-08 ENCOUNTER — APPOINTMENT (OUTPATIENT)
Dept: MRI IMAGING | Facility: HOSPITAL | Age: 70
End: 2025-02-08
Attending: Other
Payer: MEDICARE

## 2025-02-08 VITALS
RESPIRATION RATE: 18 BRPM | DIASTOLIC BLOOD PRESSURE: 98 MMHG | SYSTOLIC BLOOD PRESSURE: 152 MMHG | HEART RATE: 100 BPM | BODY MASS INDEX: 18.94 KG/M2 | HEIGHT: 68 IN | OXYGEN SATURATION: 99 % | WEIGHT: 125 LBS | TEMPERATURE: 97 F

## 2025-02-08 DIAGNOSIS — R42 DIZZINESS: ICD-10-CM

## 2025-02-08 DIAGNOSIS — R51.9 ACUTE NONINTRACTABLE HEADACHE, UNSPECIFIED HEADACHE TYPE: Primary | ICD-10-CM

## 2025-02-08 DIAGNOSIS — R26.81 UNSTEADY GAIT: ICD-10-CM

## 2025-02-08 DIAGNOSIS — R20.2 FACIAL TINGLING: ICD-10-CM

## 2025-02-08 DIAGNOSIS — I16.0 HYPERTENSIVE URGENCY: ICD-10-CM

## 2025-02-08 DIAGNOSIS — R20.0 FACIAL NUMBNESS: Primary | ICD-10-CM

## 2025-02-08 PROBLEM — I10 UNCONTROLLED HYPERTENSION: Status: ACTIVE | Noted: 2025-02-08

## 2025-02-08 LAB
ALBUMIN SERPL-MCNC: 4.5 G/DL (ref 3.2–4.8)
ALBUMIN/GLOB SERPL: 1.4 {RATIO} (ref 1–2)
ALP LIVER SERPL-CCNC: 64 U/L
ALT SERPL-CCNC: 15 U/L
ANION GAP SERPL CALC-SCNC: 8 MMOL/L (ref 0–18)
APTT PPP: 32.2 SECONDS (ref 23–36)
AST SERPL-CCNC: 28 U/L (ref ?–34)
ATRIAL RATE: 91 BPM
BASOPHILS # BLD AUTO: 0.1 X10(3) UL (ref 0–0.2)
BASOPHILS NFR BLD AUTO: 1 %
BILIRUB SERPL-MCNC: 0.8 MG/DL (ref 0.2–1.1)
BUN BLD-MCNC: 11 MG/DL (ref 9–23)
CALCIUM BLD-MCNC: 9.8 MG/DL (ref 8.7–10.6)
CHLORIDE SERPL-SCNC: 101 MMOL/L (ref 98–112)
CHOLEST SERPL-MCNC: 196 MG/DL (ref ?–200)
CO2 SERPL-SCNC: 28 MMOL/L (ref 21–32)
CREAT BLD-MCNC: 0.95 MG/DL
EGFRCR SERPLBLD CKD-EPI 2021: 64 ML/MIN/1.73M2 (ref 60–?)
EOSINOPHIL # BLD AUTO: 0.33 X10(3) UL (ref 0–0.7)
EOSINOPHIL NFR BLD AUTO: 3.4 %
ERYTHROCYTE [DISTWIDTH] IN BLOOD BY AUTOMATED COUNT: 13.6 %
EST. AVERAGE GLUCOSE BLD GHB EST-MCNC: 120 MG/DL (ref 68–126)
GLOBULIN PLAS-MCNC: 3.2 G/DL (ref 2–3.5)
GLUCOSE BLD-MCNC: 105 MG/DL (ref 70–99)
GLUCOSE BLD-MCNC: 105 MG/DL (ref 70–99)
GLUCOSE BLD-MCNC: 117 MG/DL (ref 70–99)
GLUCOSE BLD-MCNC: 91 MG/DL (ref 70–99)
HBA1C MFR BLD: 5.8 % (ref ?–5.7)
HCT VFR BLD AUTO: 42.9 %
HDLC SERPL-MCNC: 77 MG/DL (ref 40–59)
HGB BLD-MCNC: 14.5 G/DL
IMM GRANULOCYTES # BLD AUTO: 0.05 X10(3) UL (ref 0–1)
IMM GRANULOCYTES NFR BLD: 0.5 %
INR BLD: 1.01 (ref 0.8–1.2)
LDLC SERPL CALC-MCNC: 102 MG/DL (ref ?–100)
LYMPHOCYTES # BLD AUTO: 2.39 X10(3) UL (ref 1–4)
LYMPHOCYTES NFR BLD AUTO: 24.6 %
MCH RBC QN AUTO: 29.6 PG (ref 26–34)
MCHC RBC AUTO-ENTMCNC: 33.8 G/DL (ref 31–37)
MCV RBC AUTO: 87.6 FL
MONOCYTES # BLD AUTO: 0.57 X10(3) UL (ref 0.1–1)
MONOCYTES NFR BLD AUTO: 5.9 %
NEUTROPHILS # BLD AUTO: 6.29 X10 (3) UL (ref 1.5–7.7)
NEUTROPHILS # BLD AUTO: 6.29 X10(3) UL (ref 1.5–7.7)
NEUTROPHILS NFR BLD AUTO: 64.6 %
NONHDLC SERPL-MCNC: 119 MG/DL (ref ?–130)
OSMOLALITY SERPL CALC.SUM OF ELEC: 284 MOSM/KG (ref 275–295)
P AXIS: 69 DEGREES
P-R INTERVAL: 178 MS
PLATELET # BLD AUTO: 216 10(3)UL (ref 150–450)
POTASSIUM SERPL-SCNC: 4.6 MMOL/L (ref 3.5–5.1)
PROT SERPL-MCNC: 7.7 G/DL (ref 5.7–8.2)
PROTHROMBIN TIME: 13.4 SECONDS (ref 11.6–14.8)
Q-T INTERVAL: 366 MS
QRS DURATION: 76 MS
QTC CALCULATION (BEZET): 450 MS
R AXIS: -31 DEGREES
RBC # BLD AUTO: 4.9 X10(6)UL
SODIUM SERPL-SCNC: 137 MMOL/L (ref 136–145)
T AXIS: 78 DEGREES
TRIGL SERPL-MCNC: 99 MG/DL (ref 30–149)
TROPONIN I SERPL HS-MCNC: 3 NG/L
VENTRICULAR RATE: 91 BPM
VLDLC SERPL CALC-MCNC: 16 MG/DL (ref 0–30)
WBC # BLD AUTO: 9.7 X10(3) UL (ref 4–11)

## 2025-02-08 PROCEDURE — 70496 CT ANGIOGRAPHY HEAD: CPT | Performed by: EMERGENCY MEDICINE

## 2025-02-08 PROCEDURE — 70551 MRI BRAIN STEM W/O DYE: CPT | Performed by: OTHER

## 2025-02-08 PROCEDURE — 70498 CT ANGIOGRAPHY NECK: CPT | Performed by: EMERGENCY MEDICINE

## 2025-02-08 PROCEDURE — 70450 CT HEAD/BRAIN W/O DYE: CPT | Performed by: EMERGENCY MEDICINE

## 2025-02-08 RX ORDER — HYDRALAZINE HYDROCHLORIDE 20 MG/ML
10 INJECTION INTRAMUSCULAR; INTRAVENOUS EVERY 2 HOUR PRN
Status: DISCONTINUED | OUTPATIENT
Start: 2025-02-08 | End: 2025-02-09

## 2025-02-08 RX ORDER — SODIUM PHOSPHATE, DIBASIC AND SODIUM PHOSPHATE, MONOBASIC 7; 19 G/230ML; G/230ML
1 ENEMA RECTAL ONCE AS NEEDED
Status: DISCONTINUED | OUTPATIENT
Start: 2025-02-08 | End: 2025-02-09

## 2025-02-08 RX ORDER — ACETAMINOPHEN 500 MG
500 TABLET ORAL EVERY 4 HOURS PRN
Status: DISCONTINUED | OUTPATIENT
Start: 2025-02-08 | End: 2025-02-09

## 2025-02-08 RX ORDER — LABETALOL HYDROCHLORIDE 5 MG/ML
10 INJECTION, SOLUTION INTRAVENOUS EVERY 10 MIN PRN
Status: COMPLETED | OUTPATIENT
Start: 2025-02-08 | End: 2025-02-09

## 2025-02-08 RX ORDER — LABETALOL HYDROCHLORIDE 5 MG/ML
20 INJECTION, SOLUTION INTRAVENOUS ONCE
Status: COMPLETED | OUTPATIENT
Start: 2025-02-08 | End: 2025-02-08

## 2025-02-08 RX ORDER — BISACODYL 10 MG
10 SUPPOSITORY, RECTAL RECTAL
Status: DISCONTINUED | OUTPATIENT
Start: 2025-02-08 | End: 2025-02-09

## 2025-02-08 RX ORDER — ACETAMINOPHEN 650 MG/1
650 SUPPOSITORY RECTAL EVERY 4 HOURS PRN
Status: DISCONTINUED | OUTPATIENT
Start: 2025-02-08 | End: 2025-02-09

## 2025-02-08 RX ORDER — ACETAMINOPHEN 325 MG/1
650 TABLET ORAL EVERY 4 HOURS PRN
Status: DISCONTINUED | OUTPATIENT
Start: 2025-02-08 | End: 2025-02-09

## 2025-02-08 RX ORDER — SODIUM CHLORIDE 9 MG/ML
INJECTION, SOLUTION INTRAVENOUS CONTINUOUS
Status: DISCONTINUED | OUTPATIENT
Start: 2025-02-08 | End: 2025-02-09

## 2025-02-08 RX ORDER — ROSUVASTATIN CALCIUM 10 MG/1
20 TABLET, COATED ORAL NIGHTLY
Status: DISCONTINUED | OUTPATIENT
Start: 2025-02-08 | End: 2025-02-08

## 2025-02-08 RX ORDER — ASPIRIN 81 MG/1
324 TABLET, CHEWABLE ORAL ONCE
Status: COMPLETED | OUTPATIENT
Start: 2025-02-08 | End: 2025-02-08

## 2025-02-08 RX ORDER — ONDANSETRON 2 MG/ML
4 INJECTION INTRAMUSCULAR; INTRAVENOUS EVERY 6 HOURS PRN
Status: DISCONTINUED | OUTPATIENT
Start: 2025-02-08 | End: 2025-02-08

## 2025-02-08 RX ORDER — METOCLOPRAMIDE HYDROCHLORIDE 5 MG/ML
5 INJECTION INTRAMUSCULAR; INTRAVENOUS EVERY 8 HOURS PRN
Status: DISCONTINUED | OUTPATIENT
Start: 2025-02-08 | End: 2025-02-09

## 2025-02-08 RX ORDER — ONDANSETRON 2 MG/ML
4 INJECTION INTRAMUSCULAR; INTRAVENOUS EVERY 6 HOURS PRN
Status: DISCONTINUED | OUTPATIENT
Start: 2025-02-08 | End: 2025-02-09

## 2025-02-08 RX ORDER — ATORVASTATIN CALCIUM 40 MG/1
40 TABLET, FILM COATED ORAL NIGHTLY
Status: DISCONTINUED | OUTPATIENT
Start: 2025-02-08 | End: 2025-02-09

## 2025-02-08 RX ORDER — POLYETHYLENE GLYCOL 3350 17 G/17G
17 POWDER, FOR SOLUTION ORAL DAILY PRN
Status: DISCONTINUED | OUTPATIENT
Start: 2025-02-08 | End: 2025-02-09

## 2025-02-08 RX ORDER — ASPIRIN 81 MG/1
81 TABLET ORAL DAILY
Status: DISCONTINUED | OUTPATIENT
Start: 2025-02-09 | End: 2025-02-09

## 2025-02-08 RX ORDER — SENNOSIDES 8.6 MG
17.2 TABLET ORAL NIGHTLY PRN
Status: DISCONTINUED | OUTPATIENT
Start: 2025-02-08 | End: 2025-02-09

## 2025-02-08 RX ORDER — CLOPIDOGREL BISULFATE 75 MG/1
75 TABLET ORAL DAILY
Status: DISCONTINUED | OUTPATIENT
Start: 2025-02-08 | End: 2025-02-09

## 2025-02-08 NOTE — H&P
[facility]     Irma Moya Patient Status:  Emergency    3/20/1955 MRN [id number]   Location [facility] EMERGENCY DEPARTMENT Attending Elroy Santiago MD   Hosp Day # 0 PCP Ck Magallanes DO     Chief Complaint: ha dizziness since 6 30 am   lower lip tingling since 9 30 am    Subjective:    History of Present Illness:     Irma Moya is a 69 year old female with hx of hlp p/w HA dizziness lower lip numbness for a few hours.She was seen in her pcps office first.She was sent to ED for evaluation and by the time she was admitted all her symptoms resolved.Initial NIHSS 2 including left facial droop, numbness/tingling to left face .    \"Prior Level of [place]: Pt typically indep with ADLs and mobility. Exercises 5x/week at the gym. Lives with spouse. Indep with all ADLs/IADLs. \"    History/Other:    Past Medical History:  Past Medical History:    Elevated liver enzymes    resolved Sept, neg w/u including liver biopsy    GERD (gastroesophageal reflux disease)    History of blood transfusion    Hypercholesteremia    Osteopenia    Retinal detachment    left    Vitamin D deficiency     Past Surgical History:   Past Surgical History:   Procedure Laterality Date    Colonoscopy  2011    normal    Colonoscopy  2021    normal    D & c      Dexa, axial site (spine, hips, pelvis)  2017    T-score -2.3 total hip, -1.9 FN    Dexa, axial site (spine, hips, pelvis)  2019    T score is -2.4 at femoral neck    Dxa bone density study axial skeleton  2022    T score -1.5 in the lumbar spine, -2.3 left femoral neck    Echo 2d dp/clrfl, cardio (dmg)  05/15/2024    Ejection fraction 60 to 65%, normal diastolic function, mild MR    Lasik      Needle biopsy liver  2017    Nuc stress test, cardio (dmg)  10/15/2024    Negative    Other surgical history  2017    liver biopsy    Other surgical history  2020    retina repair on both eyes     Stress echo (exercise)  09/19/2020    negative    Upper gi endoscopy - referral  01/03/2014    NEG      Family History:   Family History   Problem Relation Age of Onset    Cancer Father         COLON in his 80s, PROSTATE    Heart Disorder Father     Heart Disorder Mother     Diabetes Mother     Obesity Mother     Asthma Son     Heart Disorder Son         a-fib    Heart Disorder Sister     Obesity Sister     Cancer Sister         MYELOMA    Arthritis Sister         RHEUMATOID ATHRITIS    Other (MULTIPLE SCLEROSIS) Brother     Heart Disorder Brother         MI    Cancer Brother         bladder    Cancer Brother 62        colorectal cancer     Social History:    reports that she has never smoked. She has never been exposed to tobacco smoke. She has never used smokeless tobacco. She reports that she does not currently use alcohol. She reports that she does not use drugs.     Allergies: Allergies[1]    Medications:  Medications Ordered Prior to Encounter[2]    Review of Systems:   A comprehensive review of systems was completed.    Pertinent positives and negatives noted in the HPI.    Objective:   Physical Exam:    BP (!) 146/97   Pulse 74   Temp 98.7 °F (37.1 °C) (Oral)   Resp 16   Wt 129 lb 3 oz (58.6 kg)   SpO2 95%   BMI 19.64 kg/m²   General: No acute distress, Alert  Respiratory: No rhonchi, no wheezes  Cardiovascular: S1, S2. Regular rate and rhythm  Abdomen: Soft, Non-tender, non-distended, positive bowel sounds  Neuro: No new focal deficits  Extremities: No edema      Results:    Labs:      Labs Last 24 Hours:    Recent Labs   Lab 02/08/25  1104   RBC 4.90   HGB 14.5   HCT 42.9   MCV 87.6   MCH 29.6   MCHC 33.8   RDW 13.6   NEPRELIM 6.29   WBC 9.7   .0       Recent Labs   Lab 02/08/25  1104   *   BUN 11   CREATSERUM 0.95   EGFRCR 64   CA 9.8   ALB 4.5      K 4.6      CO2 28.0   ALKPHO 64   AST 28   ALT 15   BILT 0.8   TP 7.7       Estimated Glomerular Filtration Rate: 64.6  mL/min/1.73m2 (by CKD-EPI based on SCr of 0.95 mg/dL).    Lab Results   Component Value Date    INR 1.01 02/08/2025    INR 1.07 08/09/2017    INR 1.08 07/28/2017       Recent Labs   Lab 02/08/25  1104   TROPHS 3       No results for input(s): \"TROP\", \"PBNP\" in the last 168 hours.    No results for input(s): \"PCT\" in the last 168 hours.    Imaging: Imaging data reviewed in Epic.    Assessment & Plan:      #r/o tia  -mri brain:   Impression   CONCLUSION:    1. No acute infarct, acute intracranial hemorrhage, or hydrocephalus.  2. Trace chronic small vessel ischemic disease.   -asa  Lipitor  Plavix    # neg stress test 6 mo ago  # echo pending        Plan of care discussed with patient and ED physician    Una Barboza MD    Supplementary Documentation:     The 21st Century Cures Act makes medical notes like these available to patients in the interest of transparency. Please be advised this is a medical document. Medical documents are intended to carry relevant information, facts as evident, and the clinical opinion of the practitioner. The medical note is intended as peer to peer communication and may appear blunt or direct. It is written in medical language and may contain abbreviations or verbiage that are unfamiliar.                                       [1]   Allergies  Allergen Reactions    Ciprofloxacin HIVES    Keflex [Cephalexin] OTHER (SEE COMMENTS)     Possible association with elevated LFTs    Macrobid [Nitrofurantoin] ITCHING     Delayed urticarial rash several days after completing a course of medication twice   [2]   No current facility-administered medications on file prior to encounter.     Current Outpatient Medications on File Prior to Encounter   Medication Sig Dispense Refill    EPINEPHrine 0.3 MG/0.3ML Injection Solution Auto-injector Inject 1 mL (3.3333 each total) into the muscle daily.      rosuvastatin 20 MG Oral Tab Take 1 tablet (20 mg total) by mouth nightly. 90 tablet 3    Magnesium 500 MG  Oral Cap Take 500 mg by mouth As Directed.      Probiotic Product ([employer]) Take by mouth.      estradiol 0.1 MG/GM Vaginal Cream Place 0.5 g vaginally twice a week. 1 each 0    clobetasol 0.05 % External Cream Apply 1 Application topically nightly as needed (FOR ANAL ITCHING). 15 g 0    aspirin 81 MG Oral Tab Take 1 tablet (81 mg total) by mouth daily. 1 tablet 0    Melatonin 5 MG Oral Chew Tab Chew 5 mg by mouth nightly.      cetirizine (ZYRTEC) 10 MG Oral Tab Take 1 tablet (10 mg total) by mouth every other day.      Cholecalciferol (VITAMIN D3) 3000 UNITS Oral Tab Take 1 tablet by mouth daily. 1 tablet 0

## 2025-02-08 NOTE — PLAN OF CARE
PT seen today, see notes  Neuro assessment Q2 except when down for MRI   Passed dysphasia screening   No complaints of pain or dizziness since arriving to unit   A&Ox4, RA, Bowel sounds present,    Call light within reach      Problem: NEUROLOGICAL - ADULT  Goal: Achieves stable or improved neurological status  Description: INTERVENTIONS  - Assess for and report changes in neurological status  - Initiate measures to prevent increased intracranial pressure  - Maintain blood pressure and fluid volume within ordered parameters to optimize cerebral perfusion and minimize risk of hemorrhage  - Monitor temperature, glucose, and sodium. Initiate appropriate interventions as ordered  Outcome: Progressing     Problem: Impaired Functional Mobility  Goal: Achieve highest/safest level of mobility/gait  Description: Interventions:  - Assess patient's functional ability and stability  - Promote increasing activity/tolerance for mobility and gait  - Educate and engage patient/family in tolerated activity level and precautions  Outcome: Progressing

## 2025-02-08 NOTE — SIGNIFICANT EVENT
Stroke Alert initiated in ED  Last Know Normal at 0600  Pre-morbid MRS 0  Initial NIHSS 2 including left facial droop, numbness/tingling to left face  Patient accompanied to CT dept  Repeat NIHSS completed back in ED room     NIH Stroke Scale post CT scans:  1a.  Level of consciousness: 0   1b. LOC questions:  0   1c. LOC commands: 0   2.  Best Gaze: 0   3. Visual: 0   4. Facial Palsy: 0   5a. Motor left arm: 0   5b.  Motor right arm: 0   6a. Motor left le   6b.  Motor right le   7. Limb Ataxia: 0   8.  Sensory: 1 (decreased sensation to left side of face around mouth/lips)   9. Best Language:  0   10. Dysarthria: 0   11. Extinction and Inattention: 0     Total:   1         Per Dr Pratt, patient is not a candidate for TNK  Case discussed with Dr Vallejo, ED  Patient educated on stroke work up; all pertinent questions and concerns addressed     Of note:      Patient is from home with her spouse. At baseline she is a/ox4, independent with all ADL's, ambulatory. She reports that she woke up at 0600 in her normal state of health. She walked downstairs to have breakfast, and had a sudden onset of feeling off balance at 0630. Shortly afterward, she noted numbness/tingling to the left side of her face and felt like her mouth was drooping on the left side. Upon arrival to ED, she reports she still feels numbness/tingling to face, but it is improving. Left facial droop resolved while in ED.      Stroke Alert timing affected by: N/A    Addendum:  1441 Dr. Lee notified of new consult by navigator. Orders received for ASA 81 mg daily, plavix 75 mg daily, atorvastatin 40 mg daily, initiate stroke protocol. Orders placed and bedside RN updated.     Please refer to the Stroke Data Flowsheets for additional information and Stroke Alert response times.

## 2025-02-08 NOTE — PHYSICAL THERAPY NOTE
PHYSICAL THERAPY EVALUATION - INPATIENT     Room Number: 7619/7619-A  Evaluation Date: 2025  Type of Evaluation: Initial  Physician Order: PT Eval and Treat    Presenting Problem: Headache, dizziness, L lip tingling, imbalance  Co-Morbidities : CKD, osteopenia, HTN  Reason for Therapy: Mobility Dysfunction and Discharge Planning    PHYSICAL THERAPY ASSESSMENT   Patient is a 69 year old female admitted 2025 for headache, dizziness, L lip tingling, imbalance.   Patient is currently functioning at baseline with bed mobility, transfers, and gait. Prior to admission, patient's baseline is independent.     Given the patient is functioning near baseline level do not anticipate skilled therapy needs at discharge .    PLAN  Patient has been evaluated and presents with no skilled Physical Therapy needs at this time.  Patient discharged from Physical Therapy services.  Please re-order if a new functional limitation presents during this admission.         GOALS  Patient was able to achieve the following goals ...    Patient was able to transfer Safely and independently   Patient able to ambulate on level surfaces Safely and independently     HOME SITUATION  Type of Home: House  Home Layout: Two level  Stairs to Enter : 3        Stairs to Bedroom: 14    Railing: Yes    Lives With: Spouse    Drives: Yes   Patient Regularly Uses: Glasses     Prior Level of [place]: Pt typically indep with ADLs and mobility. Exercises 5x/week at the gym. Lives with spouse. Indep with all ADLs/IADLs.     SUBJECTIVE  \"I'm usually pretty active\"     OBJECTIVE  Precautions:  (SBP )  Fall Risk: Standard fall risk    WEIGHT BEARING RESTRICTION     PAIN ASSESSMENT  Ratin          COGNITION  Overall Cognitive Status:  WFL - within functional limits    RANGE OF MOTION AND STRENGTH ASSESSMENT  Upper extremity ROM and strength are within functional limits     Lower extremity ROM is within functional limits     Lower extremity strength  is within functional limits     BALANCE  Static Sitting: Normal  Dynamic Sitting: Normal  Static Standing: Normal  Dynamic Standing: Normal    ADDITIONAL TESTS  Additional Tests: Modified [place]              Modified [place]: 0                  ACTIVITY TOLERANCE  Pulse: 76  Heart Rate Source: Monitor                   O2 WALK       NEUROLOGICAL FINDINGS  Neurological Findings: Coordination - Finger to Nose;Coordination - Heel to Shin;Coordination - Rapid Alternating Movement;Sensation  Coordination - Finger to Nose: Symmetrical  Coordination - Heel to Shin: Symmetrical  Coordination - Rapid Alternating Movement: Symmetrical            AM-PAC '6-Clicks' INPATIENT SHORT FORM - BASIC MOBILITY  How much difficulty does the patient currently have...  Patient Difficulty: Turning over in bed (including adjusting bedclothes, sheets and blankets)?: None   Patient Difficulty: Sitting down on and standing up from a chair with arms (e.g., wheelchair, bedside commode, etc.): None   Patient Difficulty: Moving from lying on back to sitting on the side of the bed?: None   How much help from another person does the patient currently need...   Help from Another: Moving to and from a bed to a chair (including a wheelchair)?: None   Help from Another: Need to walk in hospital room?: None   Help from Another: Climbing 3-5 steps with a railing?: None       AM-PAC Score:  Raw Score: 24   Approx Degree of Impairment: 0%   Standardized Score (AM-PAC Scale): 61.14   CMS Modifier (G-Code): CH    FUNCTIONAL ABILITY STATUS  Gait Assessment   Functional Mobility/Gait Assessment  Gait Assistance: Independent  Distance (ft): 125  Assistive Device: None  Pattern: Within Functional Limits  Stairs:  (Pt demo sufficient strength and balance to ascend/descend flight of stairs independently)    Skilled Therapy Provided     Bed Mobility:  Rolling: NT  Supine to sit: ind   Sit to supine: ind     Transfer Mobility:  Sit to stand: ind   Stand to sit:  ind  Gait = ind    Therapist's comments:RN cleared for session. Pt agreeable for therapy, received supine. Pt endorses she is functioning at her baseline with no concerns regarding functional mobility at this time.     Exercise/Education Provided:  Bed mobility  Body mechanics  Energy conservation  Functional activity tolerated  Gait training  Posture  Strengthening  Transfer training    Patient End of Session: In bed;Needs met;Call light within reach;RN aware of session/findings;All patient questions and concerns addressed;Hospital anti-slip socks;Family present    Patient Evaluation Complexity Level:  History High - 3 or more personal factors and/or co-morbidities   Examination of body systems Low -  addressing 1-2 elements   Clinical Presentation Low- Stable   Clinical Decision Making Low Complexity       PT Session Time: 15 minutes  Gait Trainin minutes

## 2025-02-08 NOTE — PROGRESS NOTES
CHIEF COMPLAINT:     Chief Complaint   Patient presents with    Headache     Started this morning   Dizziness        HPI:   Irma Moya is a 69 year old female who presents for headache, dizziness, and left side lower lip tingling.  Patient reports the left side of her face feels heavy but no obvious facial droop.  Tolerates PO well at home. No n/v/d.  Denies any other aggravating or relieving factors at home. Denies any treatment attempts prior to arrival.       Current Outpatient Medications   Medication Sig Dispense Refill    EPINEPHrine 0.3 MG/0.3ML Injection Solution Auto-injector Inject 1 mL (3.3333 each total) into the muscle daily.      rosuvastatin 20 MG Oral Tab Take 1 tablet (20 mg total) by mouth nightly. 90 tablet 3    Magnesium 500 MG Oral Cap Take 500 mg by mouth As Directed.      Probiotic Product ([employer]) Take by mouth.      estradiol 0.1 MG/GM Vaginal Cream Place 0.5 g vaginally twice a week. 1 each 0    clobetasol 0.05 % External Cream Apply 1 Application topically nightly as needed (FOR ANAL ITCHING). 15 g 0    aspirin 81 MG Oral Tab Take 1 tablet (81 mg total) by mouth daily. 1 tablet 0    Melatonin 5 MG Oral Chew Tab Chew 5 mg by mouth nightly.      cetirizine (ZYRTEC) 10 MG Oral Tab Take 1 tablet (10 mg total) by mouth every other day.      Cholecalciferol (VITAMIN D3) 3000 UNITS Oral Tab Take 1 tablet by mouth daily. 1 tablet 0      Past Medical History:    Elevated liver enzymes    resolved Sept, neg w/u including liver biopsy    GERD (gastroesophageal reflux disease)    History of blood transfusion    Hypercholesteremia    Osteopenia    Retinal detachment    left    Vitamin D deficiency      Past Surgical History:   Procedure Laterality Date    Colonoscopy  03/02/2011    normal    Colonoscopy  02/01/2021    normal    D & c      Dexa, axial site (spine, hips, pelvis)  09/22/2017    T-score -2.3 total hip, -1.9 FN    Dexa, axial site (spine, hips, pelvis)  09/20/2019    T  score is -2.4 at femoral neck    Dxa bone density study axial skeleton  04/05/2022    T score -1.5 in the lumbar spine, -2.3 left femoral neck    Echo 2d dp/clrfl, cardio (dmg)  05/15/2024    Ejection fraction 60 to 65%, normal diastolic function, mild MR    Lasik  1998    Needle biopsy liver  08/09/2017    Nuc stress test, cardio (dmg)  10/15/2024    Negative    Other surgical history  07/28/2017    liver biopsy    Other surgical history  02/28/2020    retina repair on both eyes    Stress echo (exercise)  09/19/2020    negative    Upper gi endoscopy - referral  01/03/2014    NEG         Social History     Socioeconomic History    Marital status:    Tobacco Use    Smoking status: Never     Passive exposure: Never    Smokeless tobacco: Never   Vaping Use    Vaping status: Never Used   Substance and Sexual Activity    Alcohol use: Not Currently    Drug use: No   Other Topics Concern    Caffeine Concern No    Exercise Yes    Seat Belt Yes    Special Diet No    Stress Concern No    Weight Concern No         REVIEW OF SYSTEMS:   GENERAL: Denies fevers. Notes good appetite  SKIN: no rashes or abnormal skin lesions  HEENT: Denies sore throat,  sinus symptoms, or ear pain  LUNGS: Denies cough, denies shortness of breath or wheezing,   CARDIOVASCULAR: denies chest pain or palpitations   GI: denies N/V/C or abdominal pain  NEURO: + headaches. + left side lip tingling and facial heaviness. + intermittent dizziness.    EXAM:   BP (!) 152/98   Pulse 100   Temp 97.2 °F (36.2 °C)   Resp 18   Ht 5' 8\" (1.727 m)   Wt 125 lb (56.7 kg)   SpO2 99%   BMI 19.01 kg/m²   GENERAL: well developed, well nourished,in no apparent distress  SKIN: no rashes,no suspicious lesions  HEAD: atraumatic, normocephalic.    EYES: conjunctiva clear, EOM intact  EARS: TM's intact and without erythema, no bulging, no retraction,no fluid, bony landmarks visualized. No erythema or swelling noted to ear canals or external ears.   NOSE: Nostrils  patent, no nasal discharge, nasal mucosa reddened   THROAT: Oral mucosa pink, moist. Posterior pharynx is not erythematous. No exudates. No tonsillar hypertrophy noted.  No trismus. Uvula midline with no swelling. Voice clear/normal. No stridor  NECK: Supple, non-tender  LUNGS: clear to auscultation bilaterally, no rales, wheezes or rhonchi. Breathing is non labored.  CARDIO: RRR without murmur  NEURO: Alert & Oriented x 3. Articulation intact.  No nystagmus appreciated. Negative pronator drift and Romberg. Normal finger to nose and heel to shin. 2+ upper and lower extremity DTR's bilaterally, 5/5 UE and LE strength bilaterally. No sensory deficit. Normal muscle tone. Coordination normal. Normal gait.  Alternating supination/pronation of hands without dysdiadochokinesis.  No facial droop.  EXTREMITIES: no cyanosis, clubbing or edema  LYMPH:  No lymphadenopathy.        ASSESSMENT AND PLAN:       ICD-10-CM    1. Acute nonintractable headache, unspecified headache type  R51.9       2. Facial tingling  R20.2       3. Dizziness  R42         Discussed HPI and physical exam with pt. We discussed the limited diagnostic capacity of this clinic and there are dangerous conditions associated with her symptoms that I can not fully rule out.  I advised she be seen in the ED. Pt verbalized understanding and notes she will go to the [place] ED. She adamantly declined medical transport and notes her  will drive her. We discussed the risks of not going by medical transport including worsening condition, permanent injury and/or death. She verbalized understanding an signed refusal.      I called [place] ED and spoke to charge RN. They are expecting pt's arrival.    Patient Instructions   Medical transport declined by pt.     Please go directly to the emergency department for further evaluation and treatment.

## 2025-02-08 NOTE — ED QUICK NOTES
Orders for admission, patient is aware of plan and ready to go upstairs. Any questions, please call ED RN Raisa at extension [phone].     Patient Covid vaccination status: Fully vaccinated     COVID Test Ordered in ED: None    COVID Suspicion at Admission: N/A    Running Infusions:  None    Mental Status/LOC at time of transport: A&Ox4    Other pertinent information:   CIWA score: N/A   NIH score:  1

## 2025-02-08 NOTE — PATIENT INSTRUCTIONS
Medical transport declined by pt.     Please go directly to the emergency department for further evaluation and treatment.

## 2025-02-08 NOTE — ED PROVIDER NOTES
Patient Seen in: [facility] Emergency Department      History     Chief Complaint   Patient presents with    Fatigue     Of balance, left eye droop, headache since 0630- symptoms resolving     Stated Complaint: Off balance, left eye droop, headache- symptoms seem to be resolving    Subjective:   HPI    Patient is a 69-year-old female presents emergency room with a history of feeling unbalanced today and also feeling some drooping of the left side of her face and some tingling to the left side of her face and also the left side of her upper arm which has been ongoing since 630 this morning.  The patient states she woke up this morning feeling fine she then shortly there afterwards developed some dizziness and felt like she was unbalanced.  The patient felt some numbness and tingling to the left side of her face felt like her left eye was drooping.  Also felt some numbness and tingling to the left upper arm.  The patient denies chest pain.  Patient denies neck or back pain.  The patient denies history of any fall or trauma.  The patient denies history of any fever.  The patient denies history of any other somatic complaints or discomfort at this time.    Objective:     Past Medical History:    Elevated liver enzymes    resolved Sept, neg w/u including liver biopsy    GERD (gastroesophageal reflux disease)    History of blood transfusion    Hypercholesteremia    Osteopenia    Retinal detachment    left    Vitamin D deficiency              Past Surgical History:   Procedure Laterality Date    Colonoscopy  03/02/2011    normal    Colonoscopy  02/01/2021    normal    D & c      Dexa, axial site (spine, hips, pelvis)  09/22/2017    T-score -2.3 total hip, -1.9 FN    Dexa, axial site (spine, hips, pelvis)  09/20/2019    T score is -2.4 at femoral neck    Dxa bone density study axial skeleton  04/05/2022    T score -1.5 in the lumbar spine, -2.3 left femoral neck    Echo 2d dp/clrfl, cardio (dmg)  05/15/2024     Ejection fraction 60 to 65%, normal diastolic function, mild MR    Lasik  1998    Needle biopsy liver  08/09/2017    Nuc stress test, cardio (dmg)  10/15/2024    Negative    Other surgical history  07/28/2017    liver biopsy    Other surgical history  02/28/2020    retina repair on both eyes    Stress echo (exercise)  09/19/2020    negative    Upper gi endoscopy - referral  01/03/2014    NEG                Social History     Socioeconomic History    Marital status:    Tobacco Use    Smoking status: Never     Passive exposure: Never    Smokeless tobacco: Never   Vaping Use    Vaping status: Never Used   Substance and Sexual Activity    Alcohol use: Not Currently    Drug use: No   Other Topics Concern    Caffeine Concern No    Exercise Yes    Seat Belt Yes    Special Diet No    Stress Concern No    Weight Concern No                  Physical Exam     ED Triage Vitals [02/08/25 1103]   BP (!) 179/120   Pulse 80   Resp 16   Temp 98.7 °F (37.1 °C)   Temp src Oral   SpO2 100 %   O2 Device None (Room air)       Current Vitals:   Vital Signs  BP: 142/78  Pulse: 69  Resp: 19  Temp: 98.7 °F (37.1 °C)  Temp src: Oral  MAP (mmHg): 96    Oxygen Therapy  SpO2: 97 %  O2 Device: None (Room air)        Physical Exam  GENERAL: Well-developed, well-nourished female sitting up breathing easily in no apparent distress.  Patient is nontoxic in appearance.  HEENT: Head is normocephalic, atraumatic. Pupils are 4 mm equally round and reactive to light. Oropharynx is clear. Mucous membranes are moist.  There is no facial asymmetry or facial droop appreciated.  NECK: There is no focal tenderness to palpation appreciated.   LUNGS: Clear to auscultation bilaterally with no wheeze. There is good equal air entry bilaterally.  HEART: Regular rate and rhythm. Normal S1, S2 no S3, or S4. No murmur.  ABDOMEN: There is no focal tenderness to palpation appreciated anywhere throughout the abdomen. There is no guarding, no rebound, no mass, and no  organomegaly appreciated. There is normoactive bowel sounds.   EXTREMITIES: There is no cyanosis, clubbing, or edema appreciated. Pulses are 2+ and equal in all 4 extremities.  NEURO: Patient is awake, alert and oriented to time place and person. Motor strength is 5 over 5 in all 4 extremities. There are no gross motor or sensory deficit appreciated in the upper or lower extremities.  Cranial nerves II through XII are intact.  There is some subjective numbness to the left side of the face.          ED Course     Labs Reviewed   COMP METABOLIC PANEL (14) - Abnormal; Notable for the following components:       Result Value    Glucose 105 (*)     All other components within normal limits   TROPONIN I HIGH SENSITIVITY - Normal   PROTHROMBIN TIME (PT) - Normal   PTT, ACTIVATED - Normal   POCT GLUCOSE - Normal   CBC WITH DIFFERENTIAL WITH PLATELET     EKG    Rate, intervals and axes as noted on EKG Report.  Rate: 91  Rhythm: Sinus Rhythm  Reading: Evidence of PVCs no acute ST elevation appreciated.                CT STROKE CTA BRAIN/CTA NECK (W IV)(CPT=70496/08466)    Result Date: 2/8/2025  CONCLUSION:  No large vessel occlusion, hemodynamically significant stenosis, dissection, or aneurysm of the major arterial vasculature of the head or neck.  Findings discussed with Dr. Santiago at 11:50 a.m. Central standard time on 02/08/2025.   LOCATION:  [facility]   Dictated by (CST): Russ Zavala MD on 2/08/2025 at 11:33 AM     Finalized by (CST): Russ Zavala MD on 2/08/2025 at 11:52 AM       CT STROKE BRAIN (NO IV)(CPT=70450)    Result Date: 2/8/2025  CONCLUSION:   1. No acute intracranial process identified  Critical results were called to Dr. Santiago at 11:30 a.m. Central standard time on 02/08/2025.   LOCATION:  [facility]   Dictated by (CST): Russ Zavala MD on 2/08/2025 at 11:25 AM     Finalized by (CST): Russ Zavala MD on 2/08/2025 at 11:27 AM             MDM      Patient had an IV line established blood work drawn including a CBC,  chemistries, BUN/creatinine, and blood sugar all of which are unremarkable.  Liver function tests are found to be negative.  Troponin found to be negative.  Coags are unremarkable.  The patient's Accu-Chek is found to be normal.  Patient found to be markedly hypertensive upon arrival to the ER with neurologic complaints as noted above.  The patient's initial NIH score found to be a 1 here in the ER.  The patient states she has been told that she had elevated blood pressure in the past she did take medications previously but then was taken off the medication because they dropped her blood pressure too low.  The patient is not a candidate for TNK as her symptoms have been ongoing for greater than 4-1/2 hours also with her NIH score of 1.  The patient did undergo CT CTA with results as noted above.  There is no acute findings.  Patient given IV labetalol here in the emergency room.  Patient had some improvement after this was given and blood pressure.  The patient denied history of any other weakness or numbness at this time.  Patient with markedly elevated blood pressure and neurologic complaints as noted above will be admitted to the hospital for further observation of hypertensive urgency at this time.  Patient's case discussed with the [facility], Dr. Barboza as well as Dr. Evans.  Patient was admitted for further care.    Admission disposition: 2/8/2025 12:42 PM           Medical Decision Making      Disposition and Plan     Clinical Impression:  1. Facial numbness    2. Unsteady gait    3. Hypertensive urgency         Disposition:  Admit  2/8/2025 12:42 pm    Follow-up:  No follow-up provider specified.        Medications Prescribed:  Current Discharge Medication List              Supplementary Documentation:         Hospital Problems       Present on Admission  Date Reviewed: 1/8/2025            ICD-10-CM Noted POA    * (Principal) Uncontrolled hypertension I10 2/8/2025 Unknown         TNK/ NI  Documentation:    Date/Time last known well:   2/8/2025 6:30 AM    NIHSS on presentation: 1     Chief Complaint   Patient presents with    Fatigue     Of balance, left eye droop, headache since 0630- symptoms resolving     IV Tenecteplase (TNK) administered: No; Patient is not a Candidate for IV TNK due to: Mild nondisabling symptoms or rapidly improving symptoms    Candidate for Endovascular thrombectomy (EVT): No; Patient is not a candidate for Endovascular Thrombectomy due to: No large vessel occlusion ( LVO)  on CTA/MRA imaging      Disposition: There is no disposition on file for this visit.

## 2025-02-08 NOTE — ED INITIAL ASSESSMENT (HPI)
Patient arrives to ER with C/O of feeling unbalanced today. Pt states around 630 today she stood up and felt numbness to her lips and felt like the side of her face was drooping. Pt states she sometimes feels off balanced but it was different today. Pt states numbness on the L side.

## 2025-02-09 ENCOUNTER — APPOINTMENT (OUTPATIENT)
Dept: CV DIAGNOSTICS | Facility: HOSPITAL | Age: 70
End: 2025-02-09
Attending: Other
Payer: MEDICARE

## 2025-02-09 VITALS
SYSTOLIC BLOOD PRESSURE: 138 MMHG | DIASTOLIC BLOOD PRESSURE: 69 MMHG | TEMPERATURE: 98 F | RESPIRATION RATE: 13 BRPM | BODY MASS INDEX: 19 KG/M2 | OXYGEN SATURATION: 98 % | WEIGHT: 126 LBS | HEART RATE: 73 BPM

## 2025-02-09 LAB
GLUCOSE BLD-MCNC: 102 MG/DL (ref 70–99)
GLUCOSE BLD-MCNC: 88 MG/DL (ref 70–99)

## 2025-02-09 PROCEDURE — 99238 HOSP IP/OBS DSCHRG MGMT 30/<: CPT | Performed by: HOSPITALIST

## 2025-02-09 PROCEDURE — 99223 1ST HOSP IP/OBS HIGH 75: CPT | Performed by: OTHER

## 2025-02-09 PROCEDURE — 93306 TTE W/DOPPLER COMPLETE: CPT | Performed by: OTHER

## 2025-02-09 RX ORDER — CLOPIDOGREL BISULFATE 75 MG/1
75 TABLET ORAL DAILY
Qty: 30 TABLET | Refills: 0 | Status: SHIPPED | OUTPATIENT
Start: 2025-02-10 | End: 2025-02-12

## 2025-02-09 NOTE — PLAN OF CARE
NURSING DISCHARGE NOTE    Discharged Home via Wheelchair.  Accompanied by Family member  Belongings Taken by patient/family.    Iv removed, script given to patient, discharge instructions gone over. No further questions.             Problem: Patient/Family Goals  Goal: Patient/Family Long Term Goal  Description: Patient's Long Term Goal: Patient will recognize warning signs for stroke    Interventions:  - Patient will attend all follow up appointments and collaborate with care team    - See additional Care Plan goals for specific interventions  Outcome: Adequate for Discharge  Goal: Patient/Family Short Term Goal  Description: Patient's Short Term Goal: Patient will discharge home safely     Interventions:   - Collaboration with care team for a safe discharge home   - See additional Care Plan goals for specific interventions  Outcome: Adequate for Discharge     Problem: NEUROLOGICAL - ADULT  Goal: Achieves stable or improved neurological status  Description: INTERVENTIONS  - Assess for and report changes in neurological status  - Initiate measures to prevent increased intracranial pressure  - Maintain blood pressure and fluid volume within ordered parameters to optimize cerebral perfusion and minimize risk of hemorrhage  - Monitor temperature, glucose, and sodium. Initiate appropriate interventions as ordered  2/9/2025 1731 by Shalini Morrow RN  Outcome: Adequate for Discharge  2/9/2025 1104 by Shalini Morrow RN  Outcome: Progressing     Problem: Impaired Functional Mobility  Goal: Achieve highest/safest level of mobility/gait  Description: Interventions:  - Assess patient's functional ability and stability  - Promote increasing activity/tolerance for mobility and gait  - Educate and engage patient/family in tolerated activity level and precautions  2/9/2025 1731 by Shalini Morrow RN  Outcome: Adequate for Discharge  2/9/2025 1104 by Shalini Morrow RN  Outcome: Progressing     Problem: Impaired Activities of Daily  Living  Goal: Achieve highest/safest level of independence in self care  Description: Interventions:  - Assess ability and encourage patient to participate in ADLs to maximize function  - Promote sitting position while performing ADLs such as feeding, grooming, and bathing  - Educate and encourage patient/family in tolerated functional activity level and precautions during self-care  2/9/2025 1731 by Shalini Morrow, RN  Outcome: Adequate for Discharge  2/9/2025 1104 by Shalini Morrow, RN  Outcome: Progressing

## 2025-02-09 NOTE — SLP NOTE
ADULT SWALLOWING EVALUATION    ASSESSMENT    ASSESSMENT/OVERALL IMPRESSION:  Pt seen at bedside this AM for bedside swallow evaluation. Speech consulted per CVA protocol. Pt cooperative, pleasant, and alert. Per RN documentation and report, pt passed nursing dysphagia screening and no problems identified during PO intake at bedside. Negative imaging for acute stroke, MD concerned for possible TIA. Pt denied history of dysphagia or symptoms associated with dysphagia since admission. Pt answered questions and followed commands appropriately.     Volitional cough and swallow present, and strong. Oral motor abilities WFL at this time. Trial thin liquids via cup, self presented, with no overt s/s of aspiration. Hyolaryngeal elevation and swallow initiation judged to be WFL per palpation. Trial hard solids (regular texture) and pureed with no overt s/s of aspiration. Functional mastication with no oral residue observed. Pt demonstrated clear vocal quality throughout assessment.    Recommend regular diet and thin liquids. No further dysphagia therapy indicated given baseline abilities and functional presentation.     Discussed cog/comm evaluation with pt; pt denied changes to abilities and declined participation. Provided education for outpatient evaluation if concerns arise post discharge; pt verbalized understanding.      Williamson Assessment of Swallow Function Score: No abnormality detected    RECOMMENDATIONS   Diet Recommendations - Solids: Regular  Diet Recommendations - Liquids: Thin Liquids            Aspiration Precautions: Upright position  Medication Administration Recommendations: No restrictions  Treatment Plan/Recommendations: No further inpatient SLP service warranted    HISTORY   MEDICAL HISTORY  Reason for Referral: Stroke protocol    Problem List  Principal Problem:    Facial numbness  Active Problems:    Uncontrolled hypertension    Unsteady gait    Hypertensive urgency      Past Medical History  Past Medical  History:    Elevated liver enzymes    resolved Sept, neg w/u including liver biopsy    GERD (gastroesophageal reflux disease)    History of blood transfusion    Hypercholesteremia    Osteopenia    Retinal detachment    left    Vitamin D deficiency       Prior Living Situation: Home alone  Diet Prior to Admission: Regular;Thin liquids       Patient/Family Goals: To go home    SWALLOWING HISTORY  Current Diet Consistency: Regular;Thin liquids  Dysphagia History: None  Imaging Results: CT Brain 2/8/25:   Impression   CONCLUSION:     No large vessel occlusion, hemodynamically significant stenosis, dissection, or aneurysm of the major arterial vasculature of the head or neck.     Findings discussed with Dr. Santiago at 11:50 a.m. Central standard time on 02/08/2025.        MRI Brain 2/8/25:   Impression   CONCLUSION:    1. No acute infarct, acute intracranial hemorrhage, or hydrocephalus.  2. Trace chronic small vessel ischemic disease.       OBJECTIVE   ORAL MOTOR EXAMINATION  Dentition: Natural;Functional  Symmetry: Within Functional Limits  Strength: Within Functional Limits  Tone: Within Functional Limits  Range of Motion: Within Functional Limits  Rate of Motion: Within Functional Limits    Voice Quality: Clear  Respiratory Status: Unlabored  Consistencies Trialed: Thin liquids;Puree;Hard solid  Method of Presentation: Self presentation;Spoon;Cup;Straw;Single sips;Consecutive swallows  Patient Positioned: Upright    Oral Phase of Swallow: Within Functional Limits                      Pharyngeal Phase of Swallow: Within Functional Limits           (Please note: Silent aspiration cannot be evaluated clinically. Videofluoroscopic Swallow Study is required to rule-out silent aspiration.)    Esophageal Phase of Swallow: No complaints consistent with possible esophageal involvement      FOLLOW UP  Treatment Plan/Recommendations: No further inpatient SLP service warranted     Follow Up Needed (Documentation Required): No        Thank you for your referral.   If you have any questions, please contact Edwina Rivera, SLP

## 2025-02-09 NOTE — PLAN OF CARE
Patient did have elevated BP with associated headache. MD notified. Tylenol given.   A&Ox4, RA, Bowel sounds active, BM this AM, Neuro assessment completed   Call light within reach, all safety measures maintained          Problem: NEUROLOGICAL - ADULT  Goal: Achieves stable or improved neurological status  Description: INTERVENTIONS  - Assess for and report changes in neurological status  - Initiate measures to prevent increased intracranial pressure  - Maintain blood pressure and fluid volume within ordered parameters to optimize cerebral perfusion and minimize risk of hemorrhage  - Monitor temperature, glucose, and sodium. Initiate appropriate interventions as ordered  Outcome: Progressing

## 2025-02-09 NOTE — CONSULTS
[facility]   NEUROLOGY   CONSULT NOTE    Admission date: 2/8/2025  Reason for Consult: TIA.  Chief Complaint:   Chief Complaint   Patient presents with    Fatigue     Of balance, left eye droop, headache since 0630- symptoms resolving   ________________________________________________________________    History     History of Presenting Illness  69 year old female with hyperlipidemia and history of abnormal liver enzymes consulted for possible TIA.  Patient started complaining of lower lip numbness on the both sides, feeling of droopiness of the left eye and unsteadiness.  Patient apparently had no facial numbness as reported previously.  However, patient's blood pressure at presentation was noted to be 180/120.  Patient has no known history of hypertension.  Patient currently back to normal.    History obtained from patient, spouse, chart review.    Past Medical History:    Elevated liver enzymes    resolved Sept, neg w/u including liver biopsy    GERD (gastroesophageal reflux disease)    History of blood transfusion    Hypercholesteremia    Osteopenia    Retinal detachment    left    Vitamin D deficiency     Past Surgical History:   Procedure Laterality Date    Colonoscopy  03/02/2011    normal    Colonoscopy  02/01/2021    normal    D & c      Dexa, axial site (spine, hips, pelvis)  09/22/2017    T-score -2.3 total hip, -1.9 FN    Dexa, axial site (spine, hips, pelvis)  09/20/2019    T score is -2.4 at femoral neck    Dxa bone density study axial skeleton  04/05/2022    T score -1.5 in the lumbar spine, -2.3 left femoral neck    Echo 2d dp/clrfl, cardio (dmg)  05/15/2024    Ejection fraction 60 to 65%, normal diastolic function, mild MR    Lasik  1998    Needle biopsy liver  08/09/2017    Nuc stress test, cardio (dmg)  10/15/2024    Negative    Other surgical history  07/28/2017    liver biopsy    Other surgical history  02/28/2020    retina repair on both eyes    Stress echo (exercise)   09/19/2020    negative    Upper gi endoscopy - referral  01/03/2014    NEG     Social History     Socioeconomic History    Marital status:    Tobacco Use    Smoking status: Never     Passive exposure: Never    Smokeless tobacco: Never   Vaping Use    Vaping status: Never Used   Substance and Sexual Activity    Alcohol use: Not Currently    Drug use: No   Other Topics Concern    Caffeine Concern No    Exercise Yes    Seat Belt Yes    Special Diet No    Stress Concern No    Weight Concern No     Social Drivers of Health     Food Insecurity: No Food Insecurity (2/8/2025)    NCSS - Food Insecurity     Worried About Running Out of Food in the Last Year: No     Ran Out of Food in the Last Year: No   Transportation Needs: No Transportation Needs (2/8/2025)    NCSS - Transportation     Lack of Transportation: No   Housing Stability: Not At Risk (2/8/2025)    NCSS - Housing/Utilities     Has Housing: Yes     Worried About Losing Housing: No     Unable to Get Utilities: No     Family History   Problem Relation Age of Onset    Cancer Father         COLON in his 80s, PROSTATE    Heart Disorder Father     Heart Disorder Mother     Diabetes Mother     Obesity Mother     Asthma Son     Heart Disorder Son         a-fib    Heart Disorder Sister     Obesity Sister     Cancer Sister         MYELOMA    Arthritis Sister         RHEUMATOID ATHRITIS    Other (MULTIPLE SCLEROSIS) Brother     Heart Disorder Brother         MI    Cancer Brother         bladder    Cancer Brother 62        colorectal cancer     Allergies Allergies[1]    Home Meds  Current Outpatient Medications   Medication Instructions    aspirin 81 mg, Oral, Daily    cetirizine (ZYRTEC) 10 mg, Every other day    Cholecalciferol (VITAMIN D3) 3000 UNITS Oral Tab 1 tablet, Oral, Daily    clobetasol 0.05 % External Cream 1 Application , Topical, Nightly PRN    EPINEPHrine (EPIPEN) 1 mg, Daily    estradiol (ESTRACE) 0.5 g, Vaginal, Twice weekly    Magnesium 500 mg, As  Directed    Melatonin 5 mg, Nightly    Probiotic Product ([employer] OR) Take by mouth.    rosuvastatin (CRESTOR) 20 mg, Oral, Nightly     Scheduled Meds:   aspirin  81 mg Oral Daily    melatonin  5 mg Oral Nightly    atorvastatin  40 mg Oral Nightly    clopidogrel  75 mg Oral Daily     Continuous Infusions:   sodium chloride Stopped (02/09/25 1030)     PRN Meds:  acetaminophen    metoclopramide    polyethylene glycol (PEG 3350)    sennosides    bisacodyl    fleet enema    acetaminophen **OR** acetaminophen    labetalol    hydrALAzine    ondansetron    OBJECTIVE   VITAL SIGNS:   Temp:  [97.5 °F (36.4 °C)-98.7 °F (37.1 °C)] 98.2 °F (36.8 °C)  Pulse:  [69-93] 77  Resp:  [16-33] 21  BP: ()/(56-90) 150/81  SpO2:  [93 %-99 %] 99 %    PHYSICAL EXAM:    NEUROLOGIC:    Mental Status:  A&O x 4, Follows simple commands, no obvious aphasia or dysarthria  Cranial nerves: PERRL.  Visual fields full.  EOMI.  Face symmetric with normal movement bilaterally.  Hearing grossly intact. Tongue midline with normal movements.   Motor: Drift:  Absent; Motor exam is 5 out of 5 in all extremities bilaterally  Sensation: Intact to light touch bilaterally  Cerebellar: Normal Finger-To-Nose test      LABORATORY DATA:  Last 24 hour labs were reviewed in detail.  Recent Labs   Lab 02/08/25  1104      K 4.6      CO2 28.0   *   BUN 11   CREATSERUM 0.95     Recent Labs   Lab 02/08/25  1104   WBC 9.7   HGB 14.5   .0     Recent Labs   Lab 02/08/25  1104   ALT 15   AST 28     No results for input(s): \"MG\", \"PHOS\" in the last 168 hours.  Last A1c value was 5.8% done 2/8/2025.           Radiology:    MRI BRAIN (CPT=70551)    Result Date: 2/9/2025  CONCLUSION:  1. No acute infarct, acute intracranial hemorrhage, or hydrocephalus. 2. Trace chronic small vessel ischemic disease.   LOCATION:  [facility]   Dictated by (CST): Stromberg, LeRoy, MD on 2/09/2025 at 0:30 AM     Finalized by (CST): Stromberg, LeRoy, MD on  2/09/2025 at 0:35 AM      ASSESSMENT/PLAN   69 year old female with:    Symptoms of feeling of left facial droopiness and bilateral lower lip numbness as well as of unsteady gait in the setting of significantly elevated blood pressure in a patient with no prior history of hypertension.  Findings most consistent with hypertensive encephalopathy.  Symptoms resolved.  MRI of the brain did not show any acute intracranial abnormality.  CTA of the head and neck did not show any acute LVO/critical stenosis.  Hemoglobin A1c 5.8, .  Patient currently started on aspirin, Plavix.  Patient was also started on atorvastatin which will now be switched to rosuvastatin since patient had history of abnormal liver functions with Lipitor and has been able to tolerate rosuvastatin 20 mg daily.  Patient to continue current dose till further assessed by PCP.  Echocardiogram pending.  Neurology follow-up after 4 weeks.  No further neurology recommendations at this time.  Please feel free to call for further queries.    Principal Problem:    Facial numbness  Active Problems:    Uncontrolled hypertension    Unsteady gait    Hypertensive urgency       Tyler Lee MD  Neurohospitalist  [facility]    Disclaimer: This record was dictated using Dragon software. There may be errors due to voice recognition problems that were not realized and corrected during the completion of the note.           [1]   Allergies  Allergen Reactions    Ciprofloxacin HIVES    Keflex [Cephalexin] OTHER (SEE COMMENTS)     Possible association with elevated LFTs    Macrobid [Nitrofurantoin] ITCHING     Delayed urticarial rash several days after completing a course of medication twice

## 2025-02-09 NOTE — PLAN OF CARE
Received pt at 1930  Pt AOx4, NSR, RA, VSS  Neuro checks. See flowsheets.  IVF  D/c Planning: Echo, specialties to see  Call light within reach.  Needs currently met     Problem: NEUROLOGICAL - ADULT  Goal: Achieves stable or improved neurological status  Description: INTERVENTIONS  - Assess for and report changes in neurological status  - Initiate measures to prevent increased intracranial pressure  - Maintain blood pressure and fluid volume within ordered parameters to optimize cerebral perfusion and minimize risk of hemorrhage  - Monitor temperature, glucose, and sodium. Initiate appropriate interventions as ordered  Outcome: Progressing     Problem: Impaired Functional Mobility  Goal: Achieve highest/safest level of mobility/gait  Description: Interventions:  - Assess patient's functional ability and stability  - Promote increasing activity/tolerance for mobility and gait  - Educate and engage patient/family in tolerated activity level and precautions  - Recommend use of  RW for transfers and ambulation  Outcome: Progressing     Problem: Impaired Activities of Daily Living  Goal: Achieve highest/safest level of independence in self care  Description: Interventions:  - Assess ability and encourage patient to participate in ADLs to maximize function  - Promote sitting position while performing ADLs such as feeding, grooming, and bathing  - Educate and encourage patient/family in tolerated functional activity level and precautions during self-care  - Encourage patient to incorporate impaired side during daily activities to promote function  Outcome: Progressing

## 2025-02-09 NOTE — OCCUPATIONAL THERAPY NOTE
OCCUPATIONAL THERAPY EVALUATION - INPATIENT    Room Number: 7619/7619-A  Evaluation Date: 2/9/2025     Type of Evaluation: Initial  Presenting Problem: facial numbness    Physician Order: IP Consult to Occupational Therapy  Reason for Therapy:  ADL/IADL Dysfunction and Discharge Planning      OCCUPATIONAL THERAPY ASSESSMENT   Patient met all OT goals at independent level.    Patient reports no further questions/concerns at this time.   Discharge OT in hospital.          History: Patient is a 69 year old female admitted on 2/8/2025 with Presenting Problem: facial numbness. Co-Morbidities : CKD, osteopenia, HTN  MRI brain negative    WEIGHT BEARING RESTRICTION  Weight Bearing Restriction: None                Recommendations for nursing staff:   Transfers: ad garcía  Toileting location: Toilet    EVALUATION SESSION:  ACTIVITY TOLERANCE       COGNITION  Following Commands:  follows multistep commands without difficulty  Safety Judgement:  good awareness of safety precautions    UPPER EXTREMITY:   ROM: within functional limits   Strength: is within functional limits     EDUCATION PROVIDED  Patient Education : Role of Occupational Therapy; Plan of Care; Functional Transfer Techniques  Patient's Response to Education: Verbalized Understanding    PATIENT START OF SESSION: semi suipne  FUNCTIONAL TRANSFER ASSESSMENT  Sit to Stand: Edge of Bed  Edge of Bed: Independent  Toilet Transfer: Independent    BED MOBILITY  Supine to Sit : Independent  Sit to Supine (OT): Independent    BALANCE ASSESSMENT  Static Sitting: Independent  Static Standing: Independent    FUNCTIONAL ADL ASSESSMENT  Grooming Standing: Independent  LB Dressing Seated: Independent  Toileting Seated: Independent      EQUIPMENT USED: none  Demonstrates functional use    THERAPIST COMMENTS: ADL/mobility as noted. Independent ambulation in molly and chair transfer. No deficits noted.     Patient End of Session: Up in chair;With  staff;Call light within  reach;Needs met;RN aware of session/findings;All patient questions and concerns addressed    OCCUPATIONAL PROFILE    HOME SITUATION  Type of Home: House  Home Layout: Two level  Lives With: Spouse    Toilet and Equipment: Comfort height toilet  Shower/Tub and Equipment: Tub-shower combo          Hand Dominance: Right  Drives: Yes  Patient Regularly Uses: Glasses    Prior Level of Function: Pt reports independence with ADL and ambulation without AD prior to admit. Pt enjoys going to the gym and doing yoga    SUBJECTIVE  Pt states it feels good to get out of the room.    PAIN ASSESSMENT  Ratin  Location: headache  Management Techniques:  (pain medication per RN)    OBJECTIVE  Precautions:  (SBP )  Fall Risk: Standard fall risk    WEIGHT BEARING RESTRICTION  Weight Bearing Restriction: None                AM-PAC ‘6-Clicks’ Inpatient Daily Activity Short Form  -   Putting on and taking off regular lower body clothing?: None  -   Bathing (including washing, rinsing, drying)?: None  -   Toileting, which includes using toilet, bedpan or urinal? : None  -   Putting on and taking off regular upper body clothing?: None  -   Taking care of personal grooming such as brushing teeth?: None  -   Eating meals?: None    AM-PAC Score:  Score: 24  Approx Degree of Impairment: 0%  Standardized Score (AM-PAC Scale): 57.54      ADDITIONAL TESTS     NEUROLOGICAL FINDINGS        PLAN   Patient has been evaluated and presents with no skilled Occupational Therapy needs at this time.  Patient discharged from Occupational Therapy services.  Please re-order if a new functional limitation presents during this admission.      Patient Evaluation Complexity Level:   Occupational Profile/Medical History LOW   Specific performance deficits impacting engagement in ADL/IADL LOW   Client Assessment/Performance Deficits LOW   Clinical Decision Making LOW   Overall Complexity LOW     OT Session Time: 25 minutes  Self-Care Home Management: 15  minutes  Therapeutic Activity:  minutes  Neuromuscular Re-education:  minutes  Therapeutic Exercise:  minutes

## 2025-02-11 ENCOUNTER — OFFICE VISIT (OUTPATIENT)
Dept: FAMILY MEDICINE CLINIC | Facility: CLINIC | Age: 70
End: 2025-02-11
Payer: MEDICARE

## 2025-02-11 VITALS
WEIGHT: 129.38 LBS | HEART RATE: 80 BPM | BODY MASS INDEX: 19.61 KG/M2 | OXYGEN SATURATION: 99 % | HEIGHT: 68 IN | RESPIRATION RATE: 20 BRPM | DIASTOLIC BLOOD PRESSURE: 90 MMHG | TEMPERATURE: 98 F | SYSTOLIC BLOOD PRESSURE: 140 MMHG

## 2025-02-11 DIAGNOSIS — Z09 FOLLOW-UP EXAM: ICD-10-CM

## 2025-02-11 DIAGNOSIS — I10 UNCONTROLLED HYPERTENSION: ICD-10-CM

## 2025-02-11 DIAGNOSIS — G45.9 TRANSIENT ISCHEMIC ATTACK (TIA): Primary | ICD-10-CM

## 2025-02-11 PROCEDURE — 1111F DSCHRG MED/CURRENT MED MERGE: CPT

## 2025-02-11 PROCEDURE — 1160F RVW MEDS BY RX/DR IN RCRD: CPT

## 2025-02-11 PROCEDURE — 1159F MED LIST DOCD IN RCRD: CPT

## 2025-02-11 PROCEDURE — 99214 OFFICE O/P EST MOD 30 MIN: CPT

## 2025-02-11 RX ORDER — LISINOPRIL 5 MG/1
5 TABLET ORAL DAILY
Qty: 90 TABLET | Refills: 0 | OUTPATIENT
Start: 2025-02-11

## 2025-02-11 RX ORDER — LISINOPRIL 5 MG/1
5 TABLET ORAL DAILY
Qty: 30 TABLET | Refills: 1 | Status: SHIPPED | OUTPATIENT
Start: 2025-02-11

## 2025-02-11 NOTE — PROGRESS NOTES
Irma Moya is a 69 year old female.  HPI:     Patient in office for Emergency Room/hospital follow up.  She was seen in the Emergency Room 3 days ago and hospital observation for lower lip numbness (bilateral) and left side drooping as well as feeling unsteady.  Blood pressure in Emergency Room was 180/120.  MRI and ct done and no signifigant findings.  Troponins negative and labs unremarkable.  She was started on clopidogrel 75 mg daily.  She took it this morning and is concerned she may have taken it last night as well.    She will be following up with neurology in 4 weeks.      Blood pressure this am at home 138/80      Current Outpatient Medications   Medication Sig Dispense Refill    clopidogrel 75 MG Oral Tab Take 1 tablet (75 mg total) by mouth daily. 30 tablet 0    EPINEPHrine 0.3 MG/0.3ML Injection Solution Auto-injector Inject 1 mL (3.3333 each total) into the muscle daily.      rosuvastatin 20 MG Oral Tab Take 1 tablet (20 mg total) by mouth nightly. 90 tablet 3    Magnesium 500 MG Oral Cap Take 500 mg by mouth As Directed.      Probiotic Product (Cloudy.fr) Take by mouth.      estradiol 0.1 MG/GM Vaginal Cream Place 0.5 g vaginally twice a week. 1 each 0    clobetasol 0.05 % External Cream Apply 1 Application topically nightly as needed (FOR ANAL ITCHING). 15 g 0    aspirin 81 MG Oral Tab Take 1 tablet (81 mg total) by mouth daily. 1 tablet 0    Melatonin 5 MG Oral Chew Tab Chew 5 mg by mouth nightly.      cetirizine (ZYRTEC) 10 MG Oral Tab Take 1 tablet (10 mg total) by mouth every other day.      Cholecalciferol (VITAMIN D3) 3000 UNITS Oral Tab Take 1 tablet by mouth daily. 1 tablet 0      Past Medical History:    Elevated liver enzymes    resolved Sept, neg w/u including liver biopsy    GERD (gastroesophageal reflux disease)    History of blood transfusion    Hypercholesteremia    Osteopenia    Retinal detachment    left    Vitamin D deficiency      Social History:  Social History      Socioeconomic History    Marital status:    Tobacco Use    Smoking status: Never     Passive exposure: Never    Smokeless tobacco: Never   Vaping Use    Vaping status: Never Used   Substance and Sexual Activity    Alcohol use: Not Currently    Drug use: No   Other Topics Concern    Caffeine Concern No    Exercise Yes    Seat Belt Yes    Special Diet No    Stress Concern No    Weight Concern No     Social Drivers of Health     Food Insecurity: No Food Insecurity (2/8/2025)    NCSS - Food Insecurity     Worried About Running Out of Food in the Last Year: No     Ran Out of Food in the Last Year: No   Transportation Needs: No Transportation Needs (2/8/2025)    NCSS - Transportation     Lack of Transportation: No   Housing Stability: Not At Risk (2/8/2025)    NCSS - Housing/Utilities     Has Housing: Yes     Worried About Losing Housing: No     Unable to Get Utilities: No          REVIEW OF SYSTEMS:     Review of Systems   Constitutional:  Negative for activity change, appetite change and fatigue.   HENT:  Negative for congestion, hearing loss and sore throat.    Eyes:  Negative for discharge and redness.   Respiratory:  Negative for shortness of breath and wheezing.    Cardiovascular:  Negative for chest pain.   Gastrointestinal:  Positive for nausea. Negative for abdominal distention and abdominal pain.   Endocrine: Negative for cold intolerance and heat intolerance.   Genitourinary:  Negative for difficulty urinating and urgency.   Musculoskeletal:  Negative for arthralgias and back pain.   Skin:  Negative for color change.   Allergic/Immunologic: Negative for environmental allergies.   Neurological:  Negative for dizziness, syncope and headaches.   Hematological:  Negative for adenopathy. Does not bruise/bleed easily.   Psychiatric/Behavioral:  Negative for agitation, behavioral problems and confusion.        EXAM:   /90   Pulse 80   Temp 98.1 °F (36.7 °C) (Temporal)   Resp 20   Ht 5' 8\" (1.727  m)   Wt 129 lb 6.4 oz (58.7 kg)   SpO2 99%   BMI 19.68 kg/m²     Physical Exam  Constitutional:       Appearance: Normal appearance. She is normal weight.   HENT:      Head: Normocephalic and atraumatic.      Nose: Nose normal.      Mouth/Throat:      Mouth: Mucous membranes are moist.      Pharynx: Oropharynx is clear.   Eyes:      Extraocular Movements: Extraocular movements intact.      Conjunctiva/sclera: Conjunctivae normal.      Pupils: Pupils are equal, round, and reactive to light.   Cardiovascular:      Rate and Rhythm: Normal rate and regular rhythm.      Pulses: Normal pulses.      Heart sounds: Normal heart sounds.   Pulmonary:      Effort: Pulmonary effort is normal.      Breath sounds: Normal breath sounds.   Musculoskeletal:         General: Normal range of motion.      Cervical back: Normal range of motion.   Skin:     General: Skin is warm and dry.      Capillary Refill: Capillary refill takes less than 2 seconds.   Neurological:      Mental Status: She is alert and oriented to person, place, and time.   Psychiatric:         Mood and Affect: Mood normal.         Behavior: Behavior normal.          ASSESSMENT AND PLAN:     1. Follow-up exam  Follow up from Emergency Room/hospital observation for TIA.  Patient to Continue with clopidogrel use daily.  Follow up with neurology as instructed.  Lisinopril sent to pharmacy and patient instructed to continue monitoring blood pressures at home and contact office with elevated readings.  Follow up with primary care physician in 2 weeks.     2. Transient ischemic attack (TIA)  Continue with clopidogrel use.  Will contact neurologist office to see if sooner appointment is available (patient needs to be seen in the next 4 weeks).    3. Uncontrolled hypertension  Lisinopril sent to pharmacy and instructions provided.  Recommended continuing with blood pressure monitoring at home and to contact office with elevated readings (140/90 and above).  - lisinopril 5  MG Oral Tab; Take 1 tablet (5 mg total) by mouth daily.  Dispense: 30 tablet; Refill: 1    The patient indicates understanding of these issues and agrees to the plan.  The patient is asked to return in 2 weeks to follow up with primary care physician.    Halle Parham, GEORGE  2/11/2025

## 2025-02-12 ENCOUNTER — TELEMEDICINE (OUTPATIENT)
Dept: NEUROLOGY | Facility: CLINIC | Age: 70
End: 2025-02-12
Payer: MEDICARE

## 2025-02-12 DIAGNOSIS — R29.818 TRANSIENT NEUROLOGICAL SYMPTOMS: Primary | ICD-10-CM

## 2025-02-12 PROCEDURE — 99214 OFFICE O/P EST MOD 30 MIN: CPT | Performed by: OTHER

## 2025-02-12 PROCEDURE — 1160F RVW MEDS BY RX/DR IN RCRD: CPT | Performed by: OTHER

## 2025-02-12 PROCEDURE — 1159F MED LIST DOCD IN RCRD: CPT | Performed by: OTHER

## 2025-02-12 PROCEDURE — 1111F DSCHRG MED/CURRENT MED MERGE: CPT | Performed by: OTHER

## 2025-02-13 NOTE — PROGRESS NOTES
HPI:    Patient ID: Irma Moya is a 69 year old female.      This visit is conducted using Telemedicine with live, interactive video and audio.    Patient has been referred to the [facility] website at [email or web address] to review the yearly Consent to Treat document.    Patient understands and accepts financial responsibility for any deductible, co-insurance and/or co-pays associated with this service.      HPI  Irma Moya is a 69 year old female who presents for follow up for possible TIA. She presented to [facility] on 2/8/[facility]25 with symptoms of imbalance and lower lip numbness. States normally she gets lightheaded or dizziness when she stands but on the day of presentation felt she was tilting to one side, then noticed lower lip was tingling, she checked her face there was no facial droop but felt the left eyelid was little droopy. Patient to the ED, had elevated /120  CTA head/neck and MRI brain was negative for acute abnormality. She was admitted for hypertensive urgency. No further episode    HISTORY:  Past Medical History:    Elevated liver enzymes    resolved Sept, neg w/u including liver biopsy    GERD (gastroesophageal reflux disease)    History of blood transfusion    Hypercholesteremia    Osteopenia    Retinal detachment    left    Vitamin D deficiency      Past Surgical History:   Procedure Laterality Date    Colonoscopy  03/02/2011    normal    Colonoscopy  02/01/2021    normal    D & c      Dexa, axial site (spine, hips, pelvis)  09/22/2017    T-score -2.3 total hip, -1.9 FN    Dexa, axial site (spine, hips, pelvis)  09/20/2019    T score is -2.4 at femoral neck    Dxa bone density study axial skeleton  04/05/2022    T score -1.5 in the lumbar spine, -2.3 left femoral neck    [place] 2[place]cl[place] [place]  05/15/2024    Ejection fraction 60 to 65%, normal diastolic function, mild MR    Lasik  1998    Needle biopsy liver  08/09/2017    Nuc stress test, cardio [place] [place]  10/15/2024     Negative    Other surgical history  07/28/2017    liver biopsy    Other surgical history  02/28/2020    retina repair on both eyes    Stress echo (exercise)  09/19/2020    negative    Upper gi endoscopy - referral  01/03/2014    NEG      Family History   Problem Relation Age of Onset    Cancer Father         COLON in his 80s, PROSTATE    Heart Disorder Father     Heart Disorder Mother     Diabetes Mother     Obesity Mother     Asthma Son     Heart Disorder Son         a-fib    Heart Disorder Sister     Obesity Sister     Cancer Sister         MYELOMA    Arthritis Sister         RHEUMATOID ATHRITIS    Other (MULTIPLE SCLEROSIS) Brother     Heart Disorder Brother         MI    Cancer Brother         bladder    Cancer Brother 62        colorectal cancer      Social History     Socioeconomic History    Marital status:    Tobacco Use    Smoking status: Never     Passive exposure: Never    Smokeless tobacco: Never   Vaping Use    Vaping status: Never Used   Substance and Sexual Activity    Alcohol use: Not Currently    Drug use: No   Other Topics Concern    Caffeine Concern No    Exercise Yes    Seat Belt Yes    Special Diet No    Stress Concern No    Weight Concern No     Social Drivers of Health     Food Insecurity: No Food Insecurity (2/8/2025)    NCSS - Food Insecurity     Worried About Running Out of Food in the Last Year: No     Ran Out of Food in the Last Year: No   Transportation Needs: No Transportation Needs (2/8/2025)    NCSS - Transportation     Lack of Transportation: No   Housing Stability: Not At Risk (2/8/2025)    NCSS - Housing/Utilities     Has Housing: Yes     Worried About Losing Housing: No     Unable to Get Utilities: No        Review of Systems   Constitutional: Negative.    HENT: Negative.     Eyes: Negative.    Respiratory: Negative.     Cardiovascular: Negative.    Gastrointestinal: Negative.    Endocrine: Negative.    Genitourinary: Negative.    Musculoskeletal: Negative.    Skin:  Negative.    Allergic/Immunologic: Negative.    Neurological: Negative.  Negative for facial asymmetry, weakness and numbness.   Hematological: Negative.    Psychiatric/Behavioral: Negative.     All other systems reviewed and are negative.           Current Outpatient Medications   Medication Sig Dispense Refill    lisinopril 5 MG Oral Tab Take 1 tablet (5 mg total) by mouth daily. 30 tablet 1    clopidogrel 75 MG Oral Tab Take 1 tablet (75 mg total) by mouth daily. 30 tablet 0    EPINEPHrine 0.3 MG/0.3ML Injection Solution Auto-injector Inject 1 mL (3.3333 each total) into the muscle daily.      rosuvastatin 20 MG Oral Tab Take 1 tablet (20 mg total) by mouth nightly. 90 tablet 3    Magnesium 500 MG Oral Cap Take 500 mg by mouth As Directed.      Probiotic Product (Temnos) Take by mouth.      estradiol 0.1 MG/GM Vaginal Cream Place 0.5 g vaginally twice a week. 1 each 0    clobetasol 0.05 % External Cream Apply 1 Application topically nightly as needed (FOR ANAL ITCHING). 15 g 0    aspirin 81 MG Oral Tab Take 1 tablet (81 mg total) by mouth daily. 1 tablet 0    Melatonin 5 MG Oral Chew Tab Chew 5 mg by mouth nightly.      cetirizine (ZYRTEC) 10 MG Oral Tab Take 1 tablet (10 mg total) by mouth every other day.      Cholecalciferol (VITAMIN D3) 3000 UNITS Oral Tab Take 1 tablet by mouth daily. 1 tablet 0     Allergies:Allergies[1]  PHYSICAL EXAM:   Physical Exam  There were no vitals taken for this visit.    General Appearance: Well nourished, well developed, no apparent distress.     HEENT: Normocephalic and atraumatic.  Cardiovascular: Normal rate, regular rhythm     Pulmonary/Chest: Effort normal    Psych: normal mood and affect    Neurological:  Patient is awake, alert and oriented to person, place and time   Normal memory, attention/concentration, speech and language.    Cranial Nerves: II: Visual acuity: normal  II: Visual fields: normal  III: Pupils: equal, round, reactive to  light  III,IV,VI: Extra Ocular Movements: intact  V: Facial sensation: intact  VII: Facial strength: intact  VIII: Hearing: intact  IX: Palate: intact  XI: Shoulder shrug: intact  XII: Tongue movement: normal    Motor: Normal strength in all limbs  Sensory: Sensory examination is normal to light touch   Coordination: intact  Gait: normal casual gait      TESTS/IMAGING:       MRI brain: 2/9/2025    Impression   CONCLUSION:    1. No acute infarct, acute intracranial hemorrhage, or hydrocephalus.  2. Trace chronic small vessel ischemic disease                   Impression   CONCLUSION:     No large vessel occlusion, hemodynamically significant stenosis, dissection, or aneurysm of the major arterial vasculature of the head or neck.       ASSESSMENT/PLAN:       ICD-10-CM    1. Transient neurological symptoms  R29.818         Patient is a 69 year old female who presents for follow up for episode of transient neurological symptoms likely due to hypertensive urgency  No typical TIA. MRI brain negative for acute infarction  CTA head and neck  no large vessel occlusion or significant stenosis    Continue Aspirin 81 mg daily. Discontinue Clopidogrel  Continue lisinopril and optimal hypertension control    Follow up as needed      Thank you for allowing us to participate in your patient's care.      Shanique Ivy MD  Director, Stroke Program. [facility]  Diplomate American Board of Neurology          Meds This Visit:  Requested Prescriptions      No prescriptions requested or ordered in this encounter       Imaging & Referrals:  None     ID#[id number]         [1]   Allergies  Allergen Reactions    Ciprofloxacin HIVES    Keflex [Cephalexin] OTHER (SEE COMMENTS)     Possible association with elevated LFTs    Macrobid [Nitrofurantoin] ITCHING     Delayed urticarial rash several days after completing a course of medication twice

## 2025-02-14 NOTE — DISCHARGE SUMMARY
[facility]  DISCHARGE SUMMARY     Irma Moya Patient Status:  Observation    3/20/1955 MRN [id number]   Location [facility] 7NE-A Attending No att. providers found   Hosp Day # 0 PCP Ck Magallanes DO     Date of Admission: 2025  Date of Discharge:  2025     Discharge Disposition: Home or Self Care    Discharge Diagnosis:  TIA  HTN urgency    History of Present Illness: 69 year old female with hx of hlp p/w HA dizziness lower lip numbness for a few hours.She was seen in her pcps office first.She was sent to ED for evaluation and by the time she was admitted all her symptoms resolved.Initial NIHSS 2 including left facial droop, numbness/tingling to left face .     Brief Synopsis: Patient remained asymptomatic, imaging without evidence of stroke.Needs better BP control and f/u neurology.    Lace+ Score: 40  59-90 High Risk  29-58 Medium Risk  0-28   Low Risk       TCM Follow-Up Recommendation:  LACE 29-58: Moderate Risk of readmission after discharge from the hospital.      Procedures during hospitalization:       Incidental or significant findings and recommendations (brief descriptions):      Lab/Test results pending at Discharge:       Consultants:  neurology    Discharge Medication List:     Discharge Medications        START taking these medications        Instructions Prescription details   clopidogrel 75 MG Tabs  Commonly known as: Plavix      Take 1 tablet (75 mg total) by mouth daily.   Stop taking on: 2025  Quantity: 30 tablet  Refills: 0            CONTINUE taking these medications        Instructions Prescription details   aspirin 81 MG Tabs      Take 1 tablet (81 mg total) by mouth daily.   Quantity: 1 tablet  Refills: 0     cetirizine 10 MG Tabs  Commonly known as: ZyrTEC      Take 1 tablet (10 mg total) by mouth every other day.   Refills: 0     clobetasol 0.05 % Crea  Commonly known as: Temovate      Apply 1 Application topically nightly as needed (FOR ANAL  ITCHING).   Quantity: 15 g  Refills: 0     EPINEPHrine 0.3 MG/0.3ML Soaj  Commonly known as: EpiPen      Inject 1 mL (3.3333 each total) into the muscle daily.   Refills: 0     estradiol 0.1 MG/GM Crea  Commonly known as: Estrace      Place 0.5 g vaginally twice a week.   Quantity: 1 each  Refills: 0     Magnesium 500 MG Caps      Take 500 mg by mouth As Directed.   Refills: 0     Melatonin 5 MG Chew      Chew 5 mg by mouth nightly.   Refills: 0     [employer] [facility] OR      Take by mouth.   Refills: 0     rosuvastatin 20 MG Tabs  Commonly known as: Crestor      Take 1 tablet (20 mg total) by mouth nightly.   Quantity: 90 tablet  Refills: 3     Vitamin D3 75 MCG (3000 UT) Tabs      Take 1 tablet by mouth daily.   Quantity: 1 tablet  Refills: 0               Where to Get Your Medications        Please  your prescriptions at the location directed by your doctor or nurse    Bring a paper prescription for each of these medications  clopidogrel 75 MG Tabs         [facility] reviewed:     Follow-up appointment:   Tyler Lee MD  [address] [place] [address] DR AUSTIN [address] [place] [id number]  [phone]    Follow up in 4 week(s)      Ck Magallanes, DO  [facility] [id number] [phone]    Schedule an appointment as soon as possible for a visit in 1 week(s)      Appointments for Next 30 Days 2025 - 3/16/2025      None            Vital signs:       Physical Exam:    General: No acute distress   Lungs: clear to auscultation  Cardiovascular: S1, S2  Abdomen: Soft      -----------------------------------------------------------------------------------------------  PATIENT DISCHARGE INSTRUCTIONS: See electronic chart    Una Barboza MD    Total time spent on discharge plannin minutes     The  [employer] Cures Act makes medical notes like these available to patients in the interest of transparency. Please be advised this is a medical document. Medical documents are intended to carry  relevant information, facts as evident, and the clinical opinion of the practitioner. The medical note is intended as peer to peer communication and may appear blunt or direct. It is written in medical language and may contain abbreviations or verbiage that are unfamiliar.

## 2025-02-17 ENCOUNTER — OFFICE VISIT (OUTPATIENT)
Dept: FAMILY MEDICINE CLINIC | Facility: CLINIC | Age: 70
End: 2025-02-17
Payer: MEDICARE

## 2025-02-17 VITALS
TEMPERATURE: 98 F | BODY MASS INDEX: 19.88 KG/M2 | HEART RATE: 80 BPM | HEIGHT: 68 IN | SYSTOLIC BLOOD PRESSURE: 128 MMHG | DIASTOLIC BLOOD PRESSURE: 74 MMHG | WEIGHT: 131.19 LBS | OXYGEN SATURATION: 98 %

## 2025-02-17 DIAGNOSIS — R29.818 TRANSIENT NEUROLOGICAL SYMPTOMS: Primary | ICD-10-CM

## 2025-02-17 DIAGNOSIS — I10 ESSENTIAL HYPERTENSION: ICD-10-CM

## 2025-02-17 PROBLEM — R26.81 UNSTEADY GAIT: Status: RESOLVED | Noted: 2025-02-08 | Resolved: 2025-02-17

## 2025-02-17 PROBLEM — R20.0 FACIAL NUMBNESS: Status: RESOLVED | Noted: 2025-02-08 | Resolved: 2025-02-17

## 2025-02-17 PROBLEM — I16.0 HYPERTENSIVE URGENCY: Status: RESOLVED | Noted: 2025-02-08 | Resolved: 2025-02-17

## 2025-02-17 PROCEDURE — 1159F MED LIST DOCD IN RCRD: CPT | Performed by: FAMILY MEDICINE

## 2025-02-17 PROCEDURE — 1111F DSCHRG MED/CURRENT MED MERGE: CPT | Performed by: FAMILY MEDICINE

## 2025-02-17 PROCEDURE — 99214 OFFICE O/P EST MOD 30 MIN: CPT | Performed by: FAMILY MEDICINE

## 2025-02-17 PROCEDURE — 1170F FXNL STATUS ASSESSED: CPT | Performed by: FAMILY MEDICINE

## 2025-02-17 NOTE — PROGRESS NOTES
Irma Moya is a 69 year old female.  Chief Complaint   Patient presents with    ER F/U    HTN       HPI:   Patient was seen at urgent care the [place] emergency room on 2/8/25 with complaints of headache, left facial droop, numbness and tingling of left lip.  At that time her blood pressure was markedly elevated.  A CTA of head and neck was unremarkable.  MRI of the brain was unremarkable.  She underwent echocardiogram with bubble study on 2/9 which was normal.  Nuclear stress test 4 months ago was also negative.  Patient was discharged on Plavix and aspirin.  She had a telemedicine visit with neurology on 12/12, this was not felt to be a TIA rather transient neurologic symptoms due to hypertensive urgency.  Plavix was discontinued    Pt got up from breakfast table, felt like she was tilted to the left, lasted several seconds, got better then ~ 4 hrs later felt left lip was tingling and felt like left eyelid was drooping but pt did not see anything different, no HA, nol vertigo  Patient has been monitoring her blood pressure at home. Since the ER , on lisinopril the past week Bp 100s-130s/60s-70s on average    Exercised today, felt lightheaded, trying to drink more  Current Outpatient Medications   Medication Sig Dispense Refill    lisinopril 5 MG Oral Tab Take 1 tablet (5 mg total) by mouth daily. 30 tablet 1    EPINEPHrine 0.3 MG/0.3ML Injection Solution Auto-injector Inject 1 mL (3.3333 each total) into the muscle daily.      rosuvastatin 20 MG Oral Tab Take 1 tablet (20 mg total) by mouth nightly. 90 tablet 3    Magnesium 500 MG Oral Cap Take 500 mg by mouth As Directed.      Probiotic Product ([employer] OR) Take by mouth.      estradiol 0.1 MG/GM Vaginal Cream Place 0.5 g vaginally twice a week. 1 each 0    clobetasol 0.05 % External Cream Apply 1 Application topically nightly as needed (FOR ANAL ITCHING). 15 g 0    aspirin 81 MG Oral Tab Take 1 tablet (81 mg total) by mouth daily. 1 tablet 0     Melatonin 5 MG Oral Chew Tab Chew 5 mg by mouth nightly.      Cholecalciferol (VITAMIN D3) 3000 UNITS Oral Tab Take 1 tablet by mouth daily. 1 tablet 0    cetirizine (ZYRTEC) 10 MG Oral Tab Take 1 tablet (10 mg total) by mouth every other day. (Patient not taking: Reported on 2/17/2025)        Past Medical History:    Elevated liver enzymes    resolved Sept, neg w/u including liver biopsy    GERD (gastroesophageal reflux disease)    History of blood transfusion    Hypercholesteremia    Osteopenia    Retinal detachment    left    Vitamin D deficiency      Social History:  Social History     Socioeconomic History    Marital status:    Tobacco Use    Smoking status: Never     Passive exposure: Never    Smokeless tobacco: Never   Vaping Use    Vaping status: Never Used   Substance and Sexual Activity    Alcohol use: Not Currently    Drug use: No   Other Topics Concern    Caffeine Concern No    Exercise Yes    Seat Belt Yes    Special Diet No    Stress Concern No    Weight Concern No     Social Drivers of Health     Food Insecurity: No Food Insecurity (2/17/2025)    NCSS - Food Insecurity     Worried About Running Out of Food in the Last Year: No     Ran Out of Food in the Last Year: No   Transportation Needs: No Transportation Needs (2/17/2025)    NCSS - Transportation     Lack of Transportation: No   Housing Stability: Not At Risk (2/17/2025)    NCSS - Housing/Utilities     Has Housing: Yes     Worried About Losing Housing: No     Unable to Get Utilities: No        REVIEW OF SYSTEMS:   GENERAL HEALTH: feels well otherwise, denies fatigue, appetite ok  SKIN: denies any unusual skin lesions or rashes  ENT: denies nasal congestion, pnd, sore throat, ear pain or pressure, decreased hearing  RESPIRATORY: denies shortness of breath with exertion,cough, wheezing  CARDIOVASCULAR: denies chest pain on exertion, edema  GI: denies abdominal pain and denies heartburn  NEURO: denies headaches  PSYCH: denies feeling stressed  or anxious    EXAM:   /74 (BP Location: Left arm, Patient Position: Sitting, Cuff Size: adult)   Pulse 80   Temp 98 °F (36.7 °C) (Temporal)   Ht 5' 8\" (1.727 m)   Wt 131 lb 3.2 oz (59.5 kg)   SpO2 98%   BMI 19.95 kg/m²   GENERAL: well developed, well nourished,in no apparent distress, well hydrated  SKIN: no rashes,no suspicious lesions  ENT: TMs: intact, good mobility, Nose: turbinates clear, no dc, Throat: no erythema, pnd, or lesions, class I airway with very shallow hypopharynx  NECK: supple,no adenopathy,no bruits, no thyromegaly  LUNGS: clear to auscultation, no w/r/r  CARDIO: RRR without murmur, occasional premature beats, peripheral pulses intact  GI: good BS's,no masses, HSM or tenderness  EXTREMITIES: FROM of all joints  Neuro: Alert and oriented x 3, cranial nerves II through XII intact, DTR symmetrical, no gross motor or sensory deficits noted, negative Romberg sign  ASSESSMENT AND PLAN:     Encounter Diagnoses   Name Primary?    Transient neurological symptoms Yes    Essential hypertension      No orders of the defined types were placed in this encounter.    Meds & Refills for this Visit:  Requested Prescriptions      No prescriptions requested or ordered in this encounter     Imaging & Consults:  OP REFERRAL TO DIAGNOSTIC SLEEP STUDY  No follow-ups on file.  Patient Instructions   I reviewed hospital records, office notes and consultation notes  I reviewed imaging, labs etc.  I discussed stroke versus TIA versus other potential causes for symptoms including stress, anxiety, hypertension, complication of sleep apnea etc.  Ref patient to continue to monitor her blood pressure once daily taking every 3 consecutive readings at various times of the day and reporting her numbers over the next several weeks.  If patient has exercise-induced lightheadedness, would consider switching lisinopril to carvedilol  Activity as tolerated, monitor clinically   The patient indicates understanding of these  issues and agrees to the plan.    Ck Magallanes DO, FAAFP

## 2025-02-17 NOTE — PATIENT INSTRUCTIONS
I reviewed hospital records, office notes and consultation notes  I reviewed imaging, labs etc.  I discussed stroke versus TIA versus other potential causes for symptoms including stress, anxiety, hypertension, complication of sleep apnea etc.  Ref patient to continue to monitor her blood pressure once daily taking every 3 consecutive readings at various times of the day and reporting her numbers over the next several weeks.  If patient has exercise-induced lightheadedness, would consider switching lisinopril to carvedilol  Activity as tolerated, monitor clinically

## 2025-03-03 NOTE — PATIENT INSTRUCTIONS
A total of 25 minutes was spent discussing signs and symptoms of depression, anxiety, the phases of grief etc.  Emotional support offered. Discussed benefits of grief counseling and medication.   Patient denies need for any assistance at this time but will normal...

## 2025-03-06 ENCOUNTER — PATIENT MESSAGE (OUTPATIENT)
Dept: FAMILY MEDICINE CLINIC | Facility: CLINIC | Age: 70
End: 2025-03-06

## 2025-03-06 DIAGNOSIS — I10 UNCONTROLLED HYPERTENSION: ICD-10-CM

## 2025-03-06 RX ORDER — LISINOPRIL 5 MG/1
5 TABLET ORAL DAILY
Qty: 90 TABLET | Refills: 1 | Status: SHIPPED | OUTPATIENT
Start: 2025-03-06

## 2025-03-06 NOTE — TELEPHONE ENCOUNTER
Lisinopril was sent in on 2/11/25 with 1 refill.     Please advise if ok to remove macrobid from allergy list.     Please advise if any recommendations on blood pressure readings.

## 2025-04-03 ENCOUNTER — OFFICE VISIT (OUTPATIENT)
Dept: SLEEP CENTER | Age: 70
End: 2025-04-03
Attending: FAMILY MEDICINE
Payer: MEDICARE

## 2025-04-03 DIAGNOSIS — R29.818 TRANSIENT NEUROLOGICAL SYMPTOMS: ICD-10-CM

## 2025-04-03 PROCEDURE — 95810 POLYSOM 6/> YRS 4/> PARAM: CPT

## 2025-04-08 ENCOUNTER — TELEPHONE (OUTPATIENT)
Dept: FAMILY MEDICINE CLINIC | Facility: CLINIC | Age: 70
End: 2025-04-08

## 2025-04-08 ENCOUNTER — PATIENT MESSAGE (OUTPATIENT)
Dept: FAMILY MEDICINE CLINIC | Facility: CLINIC | Age: 70
End: 2025-04-08

## 2025-04-08 NOTE — TELEPHONE ENCOUNTER
When calling pt regarding sleep study results she advised her BP is better controlled although still up and down. She reports the following readings:  4/5 /54  4/6 /74  4/7 /70  5/8 /84    She recently started lisinopril 5 mg daily    Dr Magallanes advised

## 2025-05-02 ENCOUNTER — SLEEP STUDY (OUTPATIENT)
Facility: CLINIC | Age: 70
End: 2025-05-02
Payer: MEDICARE

## 2025-05-02 DIAGNOSIS — G47.33 OBSTRUCTIVE SLEEP APNEA SYNDROME: Primary | ICD-10-CM

## 2025-05-02 PROCEDURE — 95810 POLYSOM 6/> YRS 4/> PARAM: CPT | Performed by: OTHER

## 2025-06-10 ENCOUNTER — OFFICE VISIT (OUTPATIENT)
Dept: FAMILY MEDICINE CLINIC | Facility: CLINIC | Age: 70
End: 2025-06-10
Payer: MEDICARE

## 2025-06-10 VITALS
DIASTOLIC BLOOD PRESSURE: 72 MMHG | SYSTOLIC BLOOD PRESSURE: 134 MMHG | WEIGHT: 126 LBS | BODY MASS INDEX: 19 KG/M2 | RESPIRATION RATE: 16 BRPM | TEMPERATURE: 98 F | HEART RATE: 83 BPM | OXYGEN SATURATION: 97 %

## 2025-06-10 DIAGNOSIS — G62.9 NEUROPATHY: ICD-10-CM

## 2025-06-10 DIAGNOSIS — R42 ORTHOSTATIC LIGHTHEADEDNESS: ICD-10-CM

## 2025-06-10 DIAGNOSIS — I10 ESSENTIAL HYPERTENSION: Primary | ICD-10-CM

## 2025-06-10 DIAGNOSIS — M25.551 HIP DISCOMFORT, RIGHT: ICD-10-CM

## 2025-06-10 DIAGNOSIS — I73.00 RAYNAUD'S PHENOMENON WITHOUT GANGRENE: ICD-10-CM

## 2025-06-10 PROCEDURE — 1160F RVW MEDS BY RX/DR IN RCRD: CPT | Performed by: FAMILY MEDICINE

## 2025-06-10 PROCEDURE — 1170F FXNL STATUS ASSESSED: CPT | Performed by: FAMILY MEDICINE

## 2025-06-10 PROCEDURE — 1159F MED LIST DOCD IN RCRD: CPT | Performed by: FAMILY MEDICINE

## 2025-06-10 PROCEDURE — 99214 OFFICE O/P EST MOD 30 MIN: CPT | Performed by: FAMILY MEDICINE

## 2025-06-10 RX ORDER — ESTRADIOL 0.1 MG/G
0.5 CREAM VAGINAL
Qty: 1 EACH | Refills: 0 | Status: SHIPPED | OUTPATIENT
Start: 2025-06-12

## 2025-06-10 NOTE — PATIENT INSTRUCTIONS
1. Essential hypertension  I reviewed goals for blood pressure as well as conservative management of hypertension including sodium restriction, daily aerobic activity, alcohol moderation, smoking cessation, and maintaining ideal body weight.  Continue current meds and monitoring    2. Raynaud's phenomenon without gangrene  Discussed the diagnosis, given the limited involvement of 1 finger and lack of symptoms, would defer any change of medication, if this becomes a greater problem then switching to a calcium channel blocker such as nifedipine from lisinopril    3. Neuropathy  Discussed possible role of prolonged gripping, vibration etc. causing burning sensation in hands.  Patient may try over-the-counter vitamin B-12 100 mcg daily    4. Hip discomfort, right  Discussed diagnosis and possible contributing factors.  Activity as tolerated,    5. Orthostatic lightheadedness  Discussed importance of increased fluid intake, avoid breath-holding.  Discussed importance of diaphragmatic breathing

## 2025-06-10 NOTE — PROGRESS NOTES
Irma Moya is a 70 year old female.  HPI:   Pt has a few concerns she wants to discuss.  hands start burning if holding things too long, R>L, or if mowing the lawn, and right index finger can turn white if cold (feet too). pain in right hip. gets tired very easily and still gets lightheaded when she stand up, which has started to affect her exercise classes. left trapezius muscle hurts. After her mammogram on 6/4,she gave blood, which in retrospect was a bad idea because it took a while to recover. Some of her BP readings have been very low: e.g. 77/48 w pulse 94 on Saturday afternoon.   Bp usually up and down but usually pretty good  Right hip discomfort x several weeks, occurs intermittently, generally at rest  Current Medications[1]   Past Medical History[2]   Social History:  Short Social Hx on File[3]     REVIEW OF SYSTEMS:   GENERAL HEALTH: feels well otherwise, denies fatigue, appetite ok  SKIN: denies any unusual skin lesions or rashes  ENT: denies nasal congestion, pnd, sore throat, ear pain or pressure, decreased hearing  RESPIRATORY: denies shortness of breath with exertion,cough, wheezing  CARDIOVASCULAR: denies chest pain on exertion, edema, patient checks blood pressure frequently.  Most blood pressure readings are between 130-100/70-80s  GI: denies abdominal pain and denies heartburn  NEURO: denies headaches, fleeting lightheadedness, usually when doing yoga, putting her head down helps to resolve this  PSYCH: denies feeling stressed or anxious  MSK: See HPI  EXAM:   /72   Pulse 83   Temp 98.3 °F (36.8 °C) (Temporal)   Resp 16   Wt 126 lb (57.2 kg)   SpO2 97%   BMI 19.16 kg/m²   GENERAL: well developed, well nourished,in no apparent distress, well hydrated  SKIN: no rashes,no suspicious lesions  ENT: TMs: intact, good mobility, Nose: turbinates clear, no dc, Throat: no erythema, pnd, or lesions  NECK: supple,no adenopathy,no bruits, no thyromegaly, negative Spurling's test, negative  Brweer's maneuver  LUNGS: clear to auscultation, no w/r/r  CARDIO: RRR with frequent premature beats without murmur, peripheral pulses intact  GI: good BS's,no masses, HSM or tenderness  Right hip: Full range of motion, minimal discomfort in the right groin crease, no pain with resisted quad contraction, hip flexion, good internal and external rotation,  Bilateral hands: Strong pulses, positive Dank's test, negative Tinel's sign,  ASSESSMENT AND PLAN:     Encounter Diagnoses   Name Primary?    Essential hypertension Yes    Raynaud's phenomenon without gangrene     Neuropathy     Hip discomfort, right     Orthostatic lightheadedness      No orders of the defined types were placed in this encounter.    Meds & Refills for this Visit:  Requested Prescriptions     Pending Prescriptions Disp Refills    estradiol 0.1 MG/GM Vaginal Cream 1 each 0     Sig: Place 0.5 g vaginally twice a week.     Imaging & Consults:  None  No follow-ups on file.  Patient Instructions   1. Essential hypertension  I reviewed goals for blood pressure as well as conservative management of hypertension including sodium restriction, daily aerobic activity, alcohol moderation, smoking cessation, and maintaining ideal body weight.  Continue current meds and monitoring    2. Raynaud's phenomenon without gangrene  Discussed the diagnosis, given the limited involvement of 1 finger and lack of symptoms, would defer any change of medication, if this becomes a greater problem then switching to a calcium channel blocker such as nifedipine from lisinopril    3. Neuropathy  Discussed possible role of prolonged gripping, vibration etc. causing burning sensation in hands.  Patient may try over-the-counter vitamin B-12 100 mcg daily    4. Hip discomfort, right  Discussed diagnosis and possible contributing factors.  Activity as tolerated,    5. Orthostatic lightheadedness  Discussed importance of increased fluid intake, avoid breath-holding.  Discussed  importance of diaphragmatic breathing     The patient indicates understanding of these issues and agrees to the plan.    Ck Magallanes DO, FAAFP         [1]   Current Outpatient Medications   Medication Sig Dispense Refill    lisinopril 5 MG Oral Tab Take 1 tablet (5 mg total) by mouth daily. 90 tablet 1    EPINEPHrine 0.3 MG/0.3ML Injection Solution Auto-injector Inject 1 mL (3.3333 each total) into the muscle daily.      rosuvastatin 20 MG Oral Tab Take 1 tablet (20 mg total) by mouth nightly. 90 tablet 3    Magnesium 500 MG Oral Cap Take 500 mg by mouth As Directed.      Probiotic Product ([employer]) Take by mouth.      estradiol 0.1 MG/GM Vaginal Cream Place 0.5 g vaginally twice a week. 1 each 0    clobetasol 0.05 % External Cream Apply 1 Application topically nightly as needed (FOR ANAL ITCHING). 15 g 0    aspirin 81 MG Oral Tab Take 1 tablet (81 mg total) by mouth daily. 1 tablet 0    Melatonin 5 MG Oral Chew Tab Chew 5 mg by mouth nightly.      cetirizine (ZYRTEC) 10 MG Oral Tab Take 1 tablet (10 mg total) by mouth every other day.      Cholecalciferol (VITAMIN D3) 3000 UNITS Oral Tab Take 1 tablet by mouth daily. 1 tablet 0   [2]   Past Medical History:   Elevated liver enzymes    resolved Sept, neg w/u including liver biopsy    GERD (gastroesophageal reflux disease)    History of blood transfusion    Hypercholesteremia    Osteopenia    Retinal detachment    left    Vitamin D deficiency   [3]   Social History  Socioeconomic History    Marital status:    Tobacco Use    Smoking status: Never     Passive exposure: Never    Smokeless tobacco: Never   Vaping Use    Vaping status: Never Used   Substance and Sexual Activity    Alcohol use: Not Currently    Drug use: No   Other Topics Concern    Caffeine Concern No    Exercise Yes    Seat Belt Yes    Special Diet No    Stress Concern No    Weight Concern No     Social Drivers of Health     Food Insecurity: No Food Insecurity (2/17/2025)     NCSS - Food Insecurity     Worried About Running Out of Food in the Last Year: No     Ran Out of Food in the Last Year: No   Transportation Needs: No Transportation Needs (2/17/2025)    NCSS - Transportation     Lack of Transportation: No   Housing Stability: Not At Risk (2/17/2025)    NCSS - Housing/Utilities     Has Housing: Yes     Worried About Losing Housing: No     Unable to Get Utilities: No

## 2025-06-18 RX ORDER — ROSUVASTATIN CALCIUM 20 MG/1
20 TABLET, COATED ORAL NIGHTLY
Qty: 90 TABLET | Refills: 1 | Status: SHIPPED | OUTPATIENT
Start: 2025-06-18

## 2025-06-18 NOTE — TELEPHONE ENCOUNTER
Last office visit: 6/10/25    Next office visit: none scheduled    Last filled: 6/21/24 Qty 90 R 3    Last labs: 2/8/25

## 2025-06-25 ENCOUNTER — TELEPHONE (OUTPATIENT)
Dept: FAMILY MEDICINE CLINIC | Facility: CLINIC | Age: 70
End: 2025-06-25

## 2025-06-25 NOTE — TELEPHONE ENCOUNTER
Moving forward patient requesting that lisinopril 5 MG Oral Tab and rosuvastatin 20 MG Oral Tab be sent through mail order pharmacy-[facility].

## (undated) DIAGNOSIS — E78.00 HYPERCHOLESTEREMIA: ICD-10-CM

## (undated) DIAGNOSIS — E87.5 SERUM POTASSIUM ELEVATED: ICD-10-CM

## (undated) DIAGNOSIS — E78.00 HYPERCHOLESTEREMIA: Primary | ICD-10-CM

## (undated) NOTE — MR AVS SNAPSHOT
[facility]  [address] [address]  [phone]               Thank you for choosing us for your health care visit with Juhi Acosta. Waldo Chan DO.   We are glad to serve you and happy to provide you with this sum DISCUSSED ABCDs OF SKIN CANCER. REGULAR AND REPEATED APPLICATION OF SUNSCREEN SPF 30 OR GREATER CAN REDUCE THE RISK OF SUNBURN ,SKIN DAMAGE, AND SUBSEQUENT DEVELOPMENT OF SKIN CANCER AND SIGNS OF AGING.   Discussed importance of monthly breast exams and an Trinh ([address]), [phone], [phone]  [address] [place] [id number]     Phone:  [phone]    - Omeprazole 40 MG Cpdr            Today's Orders     Lipid Panel [E]    Complete by:   Feb 07, 2017 (Approximate Modification Recommendation   Weight Reduction Maintain normal body weight (body mass index 18.5-24.9 kg/m2)   DASH eating plan Adopt a diet rich in fruits, vegetables, and low fat dairy products with reduced content of saturated and total fat.    Dietary s

## (undated) NOTE — MR AVS SNAPSHOT
[facility]  [address] [address]  [phone]               Thank you for choosing us for your health care visit with Arely De Oliveira. Grecia Royal DO.   We are glad to serve you and happy to provide you with this sum Possible association with elevated LFTs    Macrobid [Nitrofurantoin] Itching    Delayed urticarial rash several days after completing a course of medication twice                Today's Vital Signs     BP Pulse Temp Weight          118/68 mmHg 74 97.9 °F Visits < Visit Summaries. MyChart questions? Call [phone] for help. [employer] is NOT to be used for urgent needs. For medical emergencies, dial 911.            Visit [facility] online at  [email or web address]

## (undated) NOTE — MR AVS SNAPSHOT
[facility]  [address] [address]  [phone]               Thank you for choosing us for your health care visit with Chandu Peña. Jasmine Ricardo DO.   We are glad to serve you and happy to provide you with this sum Doxycycline Monohydrate 100 MG Tabs   Take 100 mg by mouth 2 (two) times daily. Fluticasone Propionate 50 MCG/ACT Susp   2 sprays by Each Nare route daily.    Commonly known as:  FLONASE           Omeprazole 40 MG Cpdr   Take 1 capsule (40 mg tot